# Patient Record
Sex: MALE | Race: WHITE | NOT HISPANIC OR LATINO | Employment: OTHER | URBAN - NONMETROPOLITAN AREA
[De-identification: names, ages, dates, MRNs, and addresses within clinical notes are randomized per-mention and may not be internally consistent; named-entity substitution may affect disease eponyms.]

---

## 2021-04-06 ENCOUNTER — OFFICE VISIT (OUTPATIENT)
Dept: MEDICAL GROUP | Facility: PHYSICIAN GROUP | Age: 86
End: 2021-04-06
Payer: MEDICARE

## 2021-04-06 ENCOUNTER — HOSPITAL ENCOUNTER (OUTPATIENT)
Dept: LAB | Facility: MEDICAL CENTER | Age: 86
End: 2021-04-06
Attending: INTERNAL MEDICINE
Payer: MEDICARE

## 2021-04-06 VITALS
BODY MASS INDEX: 25 KG/M2 | HEART RATE: 64 BPM | WEIGHT: 188.6 LBS | HEIGHT: 73 IN | RESPIRATION RATE: 12 BRPM | SYSTOLIC BLOOD PRESSURE: 140 MMHG | OXYGEN SATURATION: 97 % | TEMPERATURE: 97.9 F | DIASTOLIC BLOOD PRESSURE: 78 MMHG

## 2021-04-06 DIAGNOSIS — H35.30 MACULAR DEGENERATION OF BOTH EYES, UNSPECIFIED TYPE: ICD-10-CM

## 2021-04-06 DIAGNOSIS — L50.9 URTICARIA: ICD-10-CM

## 2021-04-06 DIAGNOSIS — R01.1 CARDIAC MURMUR: ICD-10-CM

## 2021-04-06 DIAGNOSIS — N40.0 BENIGN PROSTATIC HYPERPLASIA WITHOUT LOWER URINARY TRACT SYMPTOMS: ICD-10-CM

## 2021-04-06 LAB
ALBUMIN SERPL BCP-MCNC: 4.3 G/DL (ref 3.2–4.9)
ALBUMIN/GLOB SERPL: 1.3 G/DL
ALP SERPL-CCNC: 92 U/L (ref 30–99)
ALT SERPL-CCNC: 14 U/L (ref 2–50)
ANION GAP SERPL CALC-SCNC: 8 MMOL/L (ref 7–16)
AST SERPL-CCNC: 18 U/L (ref 12–45)
BASOPHILS # BLD AUTO: 0.6 % (ref 0–1.8)
BASOPHILS # BLD: 0.06 K/UL (ref 0–0.12)
BILIRUB SERPL-MCNC: 0.6 MG/DL (ref 0.1–1.5)
BUN SERPL-MCNC: 12 MG/DL (ref 8–22)
CALCIUM SERPL-MCNC: 9.3 MG/DL (ref 8.5–10.5)
CHLORIDE SERPL-SCNC: 103 MMOL/L (ref 96–112)
CO2 SERPL-SCNC: 29 MMOL/L (ref 20–33)
CREAT SERPL-MCNC: 1.01 MG/DL (ref 0.5–1.4)
EOSINOPHIL # BLD AUTO: 0.22 K/UL (ref 0–0.51)
EOSINOPHIL NFR BLD: 2.4 % (ref 0–6.9)
ERYTHROCYTE [DISTWIDTH] IN BLOOD BY AUTOMATED COUNT: 44.7 FL (ref 35.9–50)
FASTING STATUS PATIENT QL REPORTED: NORMAL
GLOBULIN SER CALC-MCNC: 3.3 G/DL (ref 1.9–3.5)
GLUCOSE SERPL-MCNC: 95 MG/DL (ref 65–99)
HCT VFR BLD AUTO: 50.6 % (ref 42–52)
HGB BLD-MCNC: 16.2 G/DL (ref 14–18)
IMM GRANULOCYTES # BLD AUTO: 0.04 K/UL (ref 0–0.11)
IMM GRANULOCYTES NFR BLD AUTO: 0.4 % (ref 0–0.9)
LYMPHOCYTES # BLD AUTO: 1.62 K/UL (ref 1–4.8)
LYMPHOCYTES NFR BLD: 17.4 % (ref 22–41)
MCH RBC QN AUTO: 30.1 PG (ref 27–33)
MCHC RBC AUTO-ENTMCNC: 32 G/DL (ref 33.7–35.3)
MCV RBC AUTO: 94.1 FL (ref 81.4–97.8)
MONOCYTES # BLD AUTO: 0.46 K/UL (ref 0–0.85)
MONOCYTES NFR BLD AUTO: 4.9 % (ref 0–13.4)
NEUTROPHILS # BLD AUTO: 6.93 K/UL (ref 1.82–7.42)
NEUTROPHILS NFR BLD: 74.3 % (ref 44–72)
NRBC # BLD AUTO: 0 K/UL
NRBC BLD-RTO: 0 /100 WBC
PLATELET # BLD AUTO: 213 K/UL (ref 164–446)
PMV BLD AUTO: 11.4 FL (ref 9–12.9)
POTASSIUM SERPL-SCNC: 4 MMOL/L (ref 3.6–5.5)
PROT SERPL-MCNC: 7.6 G/DL (ref 6–8.2)
RBC # BLD AUTO: 5.38 M/UL (ref 4.7–6.1)
SODIUM SERPL-SCNC: 140 MMOL/L (ref 135–145)
WBC # BLD AUTO: 9.3 K/UL (ref 4.8–10.8)

## 2021-04-06 PROCEDURE — 36415 COLL VENOUS BLD VENIPUNCTURE: CPT | Mod: GA

## 2021-04-06 PROCEDURE — 85025 COMPLETE CBC W/AUTO DIFF WBC: CPT

## 2021-04-06 PROCEDURE — 99214 OFFICE O/P EST MOD 30 MIN: CPT | Performed by: INTERNAL MEDICINE

## 2021-04-06 PROCEDURE — 84443 ASSAY THYROID STIM HORMONE: CPT | Mod: GA

## 2021-04-06 PROCEDURE — 80053 COMPREHEN METABOLIC PANEL: CPT

## 2021-04-06 RX ORDER — LEVOCETIRIZINE DIHYDROCHLORIDE 5 MG/1
5 TABLET, FILM COATED ORAL
Qty: 30 TABLET | Refills: 5 | Status: SHIPPED | OUTPATIENT
Start: 2021-04-06 | End: 2021-06-21

## 2021-04-06 RX ORDER — ASPIRIN/CALCIUM/MAG/ALUMINUM 325 MG
650 TABLET ORAL EVERY 4 HOURS PRN
COMMUNITY
End: 2024-01-30

## 2021-04-06 ASSESSMENT — PATIENT HEALTH QUESTIONNAIRE - PHQ9: CLINICAL INTERPRETATION OF PHQ2 SCORE: 0

## 2021-04-06 NOTE — PROGRESS NOTES
Chief Complaint   Patient presents with   • Rash     back for 6 months        HISTORY OF PRESENT ILLNESS: Patient is a 85 y.o. male established patient who presents today to discuss the medical issues below.    Urticaria  Patient reports he has had itching on his back and under the left arm x about 6 mos.  States stays about the same not getting better or worse.  He did see primary care about 3 mos ago was given hydrocortisone cream and that didn't help at all.  He reports his wife  in 2019, however, he has been using the same soaps.  He takes vitamin supplements occasionally.  Itching comes and goes, was present this am, and then resolved and is absent now. Less at night, worse during the day.  He did try some benadryl the 1 week ago, slept well, he didn't really note change to the itching.    Cardiac murmur  Patient reports history of heart murmur x most of his life.      Macular degeneration  Patient has been receiving injections and reports the eye doctor indicates this is improving.  He had last injection in  and is due again for follow up in May.  He does indicate the itching began after the first injection but not clearly associated.      Patient Active Problem List    Diagnosis Date Noted   • Urticaria 2021   • Cardiac murmur 2021   • Benign prostatic hyperplasia without lower urinary tract symptoms 2021   • Macular degeneration 2021       Allergies:Gold and Silver    Current Outpatient Medications   Medication Sig Dispense Refill   • aspirin buffered (ASCRIPTIN) 325 MG Tab Take 650 mg by mouth every four hours as needed.     • Levocetirizine Dihydrochloride 5 MG Tab Take 1 tablet by mouth 1 time a day as needed (urticaria). 30 tablet 5     No current facility-administered medications for this visit.         No past medical history on file.    Social History     Tobacco Use   • Smoking status: Not on file   Substance Use Topics   • Alcohol use: Not on file   • Drug  "use: Not on file       No family status information on file.   No family history on file.    ROS:    Respiratory: Negative for cough, sputum production, shortness of breath or wheezing.    Cardiovascular: Negative for chest pain, palpitations, orthopnea, dyspnea with exertion or edema.   Gastrointestinal: Negative for GI upset, nausea, vomiting, abdominal pain, constipation or diarrhea.   Genitourinary: Negative for dysuria, urgency, hesitancy or frequency.       Exam:    /78 (BP Location: Left arm, Patient Position: Sitting, BP Cuff Size: Adult)   Pulse 64   Temp 36.6 °C (97.9 °F) (Temporal)   Resp 12   Ht 1.854 m (6' 1\")   Wt 85.5 kg (188 lb 9.6 oz)   SpO2 97%  Body mass index is 24.88 kg/m².  General:  Well nourished, well developed male in NAD.  Skin: minimally xerotic, rash present across bilateral back around the thoracic spine macular patches slightly raised no vesicles measuring 3 to 4 cm in length 2 cm in diameter watershed distribution 1 small patch on the left which is dry and cracking on the right this is slightly edematous.  No warmth  Pulmonary: Clear to ausculation and percussion.  Normal effort. No rales, rhonchi, or wheezing.  Cardiovascular: Regular rate and rhythm grade 3 out of 6 systolic ejection murmur at the left sternal margin  Abdomen: Normal bowel sounds soft and nontender no palpable liver spleen bladder mass.  Extremities: No LE edema noted.  Neuro: Grossly nonfocal.  Psych: Alert and oriented to person, place, and time. Appropriate mood and conversation.        This dictation was created using voice recognition software. I have made reasonable attempts to correct errors, however, errors of grammar and content may exist.          Assessment/Plan:    1. Urticaria  Nonspecific rash differential could include xerotic dermatitis cannot entirely exclude secondary process involving cutaneous lymphoma etc.  Will obtain labs.  Significantly he is much better today symptomatically.  " Rash is prominent no evidence of infection.  Monitor with antihistamine nonsedating over-the-counter.  Discussed avoiding soaps, hot water and abrasion.  Early recheck consideration for biopsy as needed persistence.  Unclear if the timeframe elevation to the injections for his macular degeneration are significant or not.  No evidence of medication or soap contributing.  - Comp Metabolic Panel; Future  - CBC WITH DIFFERENTIAL; Future  - TSH WITH REFLEX TO FT4; Future    2. Cardiac murmur  Consistent with aortic stenosis clinically stable no syncope.  Monitor    3. Benign prostatic hyperplasia without lower urinary tract symptoms  Status post TURP clinically silent    4. Macular degeneration of both eyes, unspecified type  Following with ophthalmology as above.       Patient was seen for 45 minutes face to face of which more than 50% of the time was spent in counseling and coordination of care regarding the above problems.

## 2021-04-06 NOTE — PATIENT INSTRUCTIONS
Labs today  Moisturizing lotion to itching areas 2-3 x a day  Avoid soap and scrubbing to the back    Levocetirizine 5 mg 1 a day if itchy.     Recheck if persisting in 6-8 weeks, may need biopsy with referral to dermatology depending on what your lab work shows.

## 2021-04-06 NOTE — ASSESSMENT & PLAN NOTE
Patient has been receiving injections and reports the eye doctor indicates this is improving.  He had last injection in Jan and is due again for follow up in May.  He does indicate the itching began after the first injection but not clearly associated.

## 2021-04-06 NOTE — ASSESSMENT & PLAN NOTE
Patient reports he has had itching on his back and under the left arm x about 6 mos.  States stays about the same not getting better or worse.  He did see primary care about 3 mos ago was given hydrocortisone cream and that didn't help at all.  He reports his wife  in 2019, however, he has been using the same soaps.  He takes vitamin supplements occasionally.  Itching comes and goes, was present this am, and then resolved and is absent now. Less at night, worse during the day.  He did try some benadryl the 1 week ago, slept well, he didn't really note change to the itching.

## 2021-04-07 LAB — TSH SERPL DL<=0.005 MIU/L-ACNC: 1.48 UIU/ML (ref 0.38–5.33)

## 2021-06-21 ENCOUNTER — OFFICE VISIT (OUTPATIENT)
Dept: MEDICAL GROUP | Facility: PHYSICIAN GROUP | Age: 86
End: 2021-06-21
Payer: MEDICARE

## 2021-06-21 VITALS
HEIGHT: 73 IN | RESPIRATION RATE: 14 BRPM | WEIGHT: 188 LBS | HEART RATE: 86 BPM | TEMPERATURE: 98.6 F | BODY MASS INDEX: 24.92 KG/M2 | DIASTOLIC BLOOD PRESSURE: 84 MMHG | SYSTOLIC BLOOD PRESSURE: 140 MMHG | OXYGEN SATURATION: 98 %

## 2021-06-21 DIAGNOSIS — L50.9 URTICARIA: ICD-10-CM

## 2021-06-21 DIAGNOSIS — N40.0 BENIGN PROSTATIC HYPERPLASIA WITHOUT LOWER URINARY TRACT SYMPTOMS: ICD-10-CM

## 2021-06-21 DIAGNOSIS — R01.1 CARDIAC MURMUR: ICD-10-CM

## 2021-06-21 DIAGNOSIS — H35.30 MACULAR DEGENERATION OF BOTH EYES, UNSPECIFIED TYPE: ICD-10-CM

## 2021-06-21 PROCEDURE — 99214 OFFICE O/P EST MOD 30 MIN: CPT | Performed by: INTERNAL MEDICINE

## 2021-06-21 ASSESSMENT — FIBROSIS 4 INDEX: FIB4 SCORE: 1.92

## 2021-06-21 NOTE — PROGRESS NOTES
Chief Complaint   Patient presents with   • Follow-Up     blood work       HISTORY OF PRESENT ILLNESS: Patient is a 85 y.o. male established patient who presents today to discuss the medical issues below.    Macular degeneration  Patient following with opthalmology and is having injections.  He states vision is stable, having fewer injections.  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  Patient states this is his only problem.      Urticaria  Patient has found that Aleve is helpful.  Doesn't feel anything else is helping.      Cardiac murmur  Denies syncope.      Benign prostatic hyperplasia without lower urinary tract symptoms  Hx turp, no complaints.        Patient Active Problem List    Diagnosis Date Noted   • Urticaria 04/06/2021   • Cardiac murmur 04/06/2021   • Benign prostatic hyperplasia without lower urinary tract symptoms 04/06/2021   • Macular degeneration 04/06/2021       Allergies:Gold and Silver    Current Outpatient Medications   Medication Sig Dispense Refill   • aspirin buffered (ASCRIPTIN) 325 MG Tab Take 650 mg by mouth every four hours as needed. (Patient not taking: Reported on 6/21/2021)       No current facility-administered medications for this visit.         No past medical history on file.    Social History     Tobacco Use   • Smoking status: Former Smoker   • Smokeless tobacco: Never Used   Substance Use Topics   • Alcohol use: Not on file   • Drug use: Not on file       No family status information on file.   No family history on file.    ROS:    Respiratory: Negative for cough, sputum production, shortness of breath or wheezing.    Cardiovascular: Negative for chest pain, palpitations, orthopnea, dyspnea with exertion or edema.   Gastrointestinal: Negative for GI upset, nausea, vomiting, abdominal pain, constipation or diarrhea.   Genitourinary: Negative for dysuria, urgency, hesitancy or frequency.       Exam:    /84   Pulse 86   Temp 37 °C (98.6  "°F) (Temporal)   Resp 14   Ht 1.854 m (6' 1\")   Wt 85.3 kg (188 lb)   SpO2 98%  Body mass index is 24.8 kg/m².  General:  Well nourished, well developed male in NAD.  Pulmonary: Clear to ausculation and percussion.  Normal effort. No rales, rhonchi, or wheezing.  Cardiovascular: Regular rate and rhythm without coarse holosystolic left margin murmur.   Abdomen: Normal bowel sounds soft and nontender no palpable liver spleen bladder mass.  Extremities: No LE edema noted.  Neuro: Grossly nonfocal. Diminished hearing  Psych: Alert and oriented to person, place, and time. Appropriate mood and conversation.    LABS: Results reviewed and discussed with the patient, questions answered.    This dictation was created using voice recognition software. I have made reasonable attempts to correct errors, however, errors of grammar and content may exist.          Assessment/Plan:    1. Macular degeneration of both eyes, unspecified type  Support given, he is still able to target shoot.  Defer to optho    2. Urticaria  Resolved with prn aleve, reviewed labs, ok use of the aleve discussed.      3. Cardiac murmur  No syncope, support and monitor.      4. Benign prostatic hyperplasia without lower urinary tract symptoms  Stable post turp.      Patient was seen for 25 minutes face to face of which more than 50% of the time was spent in counseling and coordination of care regarding the above problems.           "

## 2021-06-21 NOTE — ASSESSMENT & PLAN NOTE
Patient following with opthalmology and is having injections.  He states vision is stable, having fewer injections.  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  Patient states this is his only problem.

## 2021-12-21 ENCOUNTER — OFFICE VISIT (OUTPATIENT)
Dept: MEDICAL GROUP | Facility: PHYSICIAN GROUP | Age: 86
End: 2021-12-21
Payer: MEDICARE

## 2021-12-21 VITALS
SYSTOLIC BLOOD PRESSURE: 140 MMHG | TEMPERATURE: 98.9 F | OXYGEN SATURATION: 96 % | HEIGHT: 73 IN | HEART RATE: 70 BPM | BODY MASS INDEX: 24.9 KG/M2 | DIASTOLIC BLOOD PRESSURE: 80 MMHG | RESPIRATION RATE: 18 BRPM | WEIGHT: 187.9 LBS

## 2021-12-21 DIAGNOSIS — L50.9 URTICARIA: ICD-10-CM

## 2021-12-21 DIAGNOSIS — E78.5 HYPERLIPIDEMIA, UNSPECIFIED HYPERLIPIDEMIA TYPE: ICD-10-CM

## 2021-12-21 DIAGNOSIS — R01.1 CARDIAC MURMUR: ICD-10-CM

## 2021-12-21 DIAGNOSIS — L40.9 PSORIASIS (A TYPE OF SKIN INFLAMMATION): ICD-10-CM

## 2021-12-21 PROCEDURE — 99214 OFFICE O/P EST MOD 30 MIN: CPT | Performed by: INTERNAL MEDICINE

## 2021-12-21 RX ORDER — TRIAMCINOLONE ACETONIDE 1 MG/G
0.5 CREAM TOPICAL 3 TIMES DAILY PRN
Qty: 45 G | Refills: 1 | Status: SHIPPED | OUTPATIENT
Start: 2021-12-21 | End: 2024-01-30

## 2021-12-21 RX ORDER — PREDNISONE 10 MG/1
TABLET ORAL
Qty: 32 TABLET | Refills: 0 | Status: SHIPPED | OUTPATIENT
Start: 2021-12-21 | End: 2024-01-30

## 2021-12-21 ASSESSMENT — FIBROSIS 4 INDEX: FIB4 SCORE: 1.94

## 2021-12-21 NOTE — PATIENT INSTRUCTIONS
Prednisone 10 mg tablet:  2 tabs 2 x a day x 2 days,   Then 3 tablets every morning x 4 days,   Then 2 tabs every morning x 4 days,   Then 1 tab every morning x 4 days    Steroid lotion topically if itching 3 x a day    Dermatology    Moisturizing lotion to itching areas 2-3 x a day  Avoid soap and scrubbing to the back

## 2021-12-21 NOTE — PROGRESS NOTES
Chief Complaint   Patient presents with   • Follow-Up   • Herpes Zoster     all over per patient- under arms, chest, back       HISTORY OF PRESENT ILLNESS: Patient is a 86 y.o. male established patient who presents today to discuss the medical issues below.    Psoriasis (a type of skin inflammation)  Today patient reports he thinks he has shingles as the rash on his body as become very uncomfortable.  He reports increase itching, he indicates the rash was a problem last time we saw him.  My note indicates it had improved.  He feels this has been a problem for over a year.  Today he indicates 6 mos ago he was given a steroid shot at [facility] which did not help at all.  The trial moisturizing lotion did not help nor did antihistamine.        Patient Active Problem List    Diagnosis Date Noted   • Psoriasis (a type of skin inflammation) 12/21/2021   • Urticaria 04/06/2021   • Cardiac murmur 04/06/2021   • Benign prostatic hyperplasia without lower urinary tract symptoms 04/06/2021   • Macular degeneration 04/06/2021       Allergies:Gold and Silver    Current Outpatient Medications   Medication Sig Dispense Refill   • predniSONE (DELTASONE) 10 MG Tab Prednisone 10 mg tablet: 2 tabs 2 x daily x 2 days, 3 tablets q am x 4 days, 2 tabs q am x 4 days, 1 tab every morning x 4 days 32 Tablet 0   • triamcinolone acetonide (KENALOG) 0.1 % Cream Apply 0.5 g topically 3 times a day as needed. 45 g 1   • aspirin buffered (ASCRIPTIN) 325 MG Tab Take 650 mg by mouth every four hours as needed. (Patient not taking: Reported on 6/21/2021)       No current facility-administered medications for this visit.         History reviewed. No pertinent past medical history.    Social History     Tobacco Use   • Smoking status: Former Smoker   • Smokeless tobacco: Never Used   Substance Use Topics   • Alcohol use: Not on file   • Drug use: Not on file       No family status information on file.   History reviewed. No pertinent family  "history.    ROS:    Respiratory: Negative for cough, sputum production, shortness of breath or wheezing.    Cardiovascular: Negative for chest pain, palpitations, orthopnea, dyspnea with exertion or edema.   Gastrointestinal: Negative for GI upset, nausea, vomiting, abdominal pain, constipation or diarrhea.   Genitourinary: Negative for dysuria, urgency, hesitancy or frequency.       Exam:    /80   Pulse 70   Temp 37.2 °C (98.9 °F) (Temporal)   Resp 18   Ht 1.854 m (6' 1\")   Wt 85.2 kg (187 lb 14.4 oz)   SpO2 96%  Body mass index is 24.79 kg/m².  General:  Well nourished, well developed male in NAD.  Skin: thickened erythematous patches, right posterior back with central excoriation, left axilla thickened and dry, patch at the right abdomen sans excoriation.    Pulmonary: Clear to ausculation and percussion.  Normal effort. No rales, rhonchi, or wheezing.  Cardiovascular: Regular rate and rhythm without murmur.   Abdomen: Normal bowel sounds soft and nontender no palpable liver spleen bladder mass.  Extremities: No LE edema noted.  Neuro: Grossly nonfocal.  Psych: Alert and oriented to person, place, and time. Appropriate mood and conversation.    LABS: Results reviewed and discussed with the patient, questions answered.  This dictation was created using voice recognition software. I have made reasonable attempts to correct errors, however, errors of grammar and content may exist.          Assessment/Plan:    1. Psoriasis (a type of skin inflammation)  More classic in appearance, now thickened plaques, monitor with prednisone 10 day course, topical steroids, dermatology.    - Referral to Dermatology    2. Urticaria  As above, labs in April all WNL including thyroid.   - Referral to Dermatology  - Comp Metabolic Panel; Future  - CBC WITH DIFFERENTIAL; Future    3. Cardiac murmur  Quite today.     4. Hyperlipidemia, unspecified hyperlipidemia type  Annual monitoring recommended.    - Lipid Profile; " Future    Patient was seen for 25 minutes face to face of which more than 50% of the time was spent in counseling and coordination of care regarding the above problems.

## 2021-12-21 NOTE — ASSESSMENT & PLAN NOTE
Today patient reports he thinks he has shingles as the rash on his body as become very uncomfortable.  He reports increase itching, he indicates the rash was a problem last time we saw him.  My note indicates it had improved.  He feels this has been a problem for over a year.  Today he indicates 6 mos ago he was given a steroid shot at [facility] which did not help at all.  The trial moisturizing lotion did not help nor did antihistamine.

## 2022-02-16 ENCOUNTER — APPOINTMENT (RX ONLY)
Dept: URBAN - NONMETROPOLITAN AREA CLINIC 15 | Facility: CLINIC | Age: 87
Setting detail: DERMATOLOGY
End: 2022-02-16

## 2022-02-16 DIAGNOSIS — R21 RASH AND OTHER NONSPECIFIC SKIN ERUPTION: ICD-10-CM

## 2022-02-16 DIAGNOSIS — L30.9 DERMATITIS, UNSPECIFIED: ICD-10-CM

## 2022-02-16 PROCEDURE — ? COUNSELING

## 2022-02-16 PROCEDURE — 99202 OFFICE O/P NEW SF 15 MIN: CPT | Mod: 25

## 2022-02-16 PROCEDURE — 11102 TANGNTL BX SKIN SINGLE LES: CPT

## 2022-02-16 PROCEDURE — 11103 TANGNTL BX SKIN EA SEP/ADDL: CPT

## 2022-02-16 PROCEDURE — ? BIOPSY BY SHAVE METHOD

## 2022-02-16 PROCEDURE — ? PRESCRIPTION

## 2022-02-16 ASSESSMENT — LOCATION ZONE DERM
LOCATION ZONE: TRUNK
LOCATION ZONE: ARM

## 2022-02-16 ASSESSMENT — LOCATION DETAILED DESCRIPTION DERM
LOCATION DETAILED: LEFT ANTERIOR PROXIMAL UPPER ARM
LOCATION DETAILED: RIGHT ANTERIOR PROXIMAL UPPER ARM
LOCATION DETAILED: LEFT RIB CAGE
LOCATION DETAILED: RIGHT INFERIOR UPPER BACK
LOCATION DETAILED: LEFT LATERAL INFERIOR CHEST

## 2022-02-16 ASSESSMENT — LOCATION SIMPLE DESCRIPTION DERM
LOCATION SIMPLE: RIGHT UPPER ARM
LOCATION SIMPLE: RIGHT UPPER BACK
LOCATION SIMPLE: CHEST
LOCATION SIMPLE: LEFT UPPER ARM
LOCATION SIMPLE: ABDOMEN

## 2022-02-16 NOTE — HPI: RASH
What Type Of Note Output Would You Prefer (Optional)?: Standard Output
Is The Patient Presenting As Previously Scheduled?: Yes
How Severe Is Your Rash?: severe
Is This A New Presentation, Or A Follow-Up?: Referral for Rash
Who Is Your Referring Provider?: Mao

## 2022-02-16 NOTE — PROCEDURE: BIOPSY BY SHAVE METHOD
Detail Level: Detailed
Depth Of Biopsy: dermis
Was A Bandage Applied: Yes
Size Of Lesion In Cm: 0
Biopsy Type: H and E
Biopsy Method: Personna blade
Anesthesia Type: 1% lidocaine with epinephrine
Anesthesia Volume In Cc: 1
Hemostasis: Electrocautery
Wound Care: Vaseline
Dressing: Band-Aid
Destruction After The Procedure: No
Type Of Destruction Used: Electrodesiccation
Curettage Text: The wound bed was treated with curettage after the biopsy was performed.
Cryotherapy Text: The wound bed was treated with cryotherapy after the biopsy was performed.
Electrodesiccation Text: The wound bed was treated with electrodesiccation after the biopsy was performed.
Electrodesiccation And Curettage Text: The wound bed was treated with electrodesiccation and curettage after the biopsy was performed.
Silver Nitrate Text: The wound bed was treated with silver nitrate after the biopsy was performed.
Lab: [id number]
Lab Facility: 
Consent: Verbal consent was obtained and risks were reviewed including but not limited to scarring, infection, bleeding, scabbing, incomplete removal, nerve damage and allergy to anesthesia.
Post-Care Instructions: I reviewed with the patient in detail post-care instructions. Patient is to keep the biopsy site dry overnight, and then apply bacitracin/petroleum  twice daily until healed. Patient may apply hydrogen peroxide soaks to remove any crusting.
Notification Instructions: Patient will be notified of biopsy results. However, patient instructed to call the office if not contacted within 2 weeks.
Billing Type: Third-Party Bill
Information: Selecting Yes will display possible errors in your note based on the variables you have selected. This validation is only offered as a suggestion for you. PLEASE NOTE THAT THE VALIDATION TEXT WILL BE REMOVED WHEN YOU FINALIZE YOUR NOTE. IF YOU WANT TO FAX A PRELIMINARY NOTE YOU WILL NEED TO TOGGLE THIS TO 'NO' IF YOU DO NOT WANT IT IN YOUR FAXED NOTE.
Consent: Written consent was obtained and risks were reviewed including but not limited to scarring, infection, bleeding, scabbing, incomplete removal, nerve damage and allergy to anesthesia.

## 2022-02-17 RX ORDER — TRIAMCINOLONE ACETONIDE 1 MG/G
CREAM TOPICAL BID
Qty: 30 | Refills: 1 | Status: ERX

## 2022-02-25 ENCOUNTER — APPOINTMENT (RX ONLY)
Dept: URBAN - METROPOLITAN AREA CLINIC 4 | Facility: CLINIC | Age: 87
Setting detail: DERMATOLOGY
End: 2022-02-25

## 2022-02-25 ENCOUNTER — RX ONLY (OUTPATIENT)
Age: 87
Setting detail: RX ONLY
End: 2022-02-25

## 2022-02-25 DIAGNOSIS — C84.0 MYCOSIS FUNGOIDES: ICD-10-CM

## 2022-02-25 PROBLEM — C84.00 MYCOSIS FUNGOIDES, UNSPECIFIED SITE: Status: ACTIVE | Noted: 2022-02-25

## 2022-02-25 PROCEDURE — ? ORDER TESTS

## 2022-02-25 RX ORDER — CLOBETASOL PROPIONATE 0.5 MG/G
CREAM TOPICAL
Qty: 120 | Refills: 2 | Status: ERX

## 2022-02-25 NOTE — PROCEDURE: ORDER TESTS
Billing Type: Third-Party Bill
Lab Facility: [id number]
Bill For Surgical Tray: no
Expected Date Of Service: 02/25/2022

## 2022-03-02 ENCOUNTER — RX ONLY (OUTPATIENT)
Age: 87
Setting detail: RX ONLY
End: 2022-03-02

## 2022-03-02 RX ORDER — CLOBETASOL PROPIONATE 0.5 MG/G
CREAM TOPICAL
Qty: 120 | Refills: 3 | Status: ERX

## 2022-03-05 ENCOUNTER — APPOINTMENT (RX ONLY)
Dept: URBAN - METROPOLITAN AREA CLINIC 4 | Facility: CLINIC | Age: 87
Setting detail: DERMATOLOGY
End: 2022-03-05

## 2022-03-05 DIAGNOSIS — C84.0 MYCOSIS FUNGOIDES: ICD-10-CM

## 2022-03-05 PROBLEM — C84.00 MYCOSIS FUNGOIDES, UNSPECIFIED SITE: Status: ACTIVE | Noted: 2022-03-05

## 2022-03-05 PROCEDURE — ? ORDER TESTS

## 2024-01-31 PROBLEM — Z86.79 HISTORY OF HEART FAILURE: Status: ACTIVE | Noted: 2024-01-31

## 2024-01-31 PROBLEM — Z86.16 HISTORY OF COVID-19: Status: ACTIVE | Noted: 2024-01-31

## 2024-01-31 PROBLEM — I48.91 ATRIAL FIBRILLATION (HCC): Status: ACTIVE | Noted: 2024-01-31

## 2024-01-31 PROBLEM — Z98.890 HISTORY OF MITRAL VALVE REPAIR: Status: ACTIVE | Noted: 2024-01-31

## 2024-03-06 PROBLEM — R21 SKIN RASH: Status: ACTIVE | Noted: 2024-03-06

## 2024-03-06 PROBLEM — I27.20 PULMONARY HTN (HCC): Status: ACTIVE | Noted: 2024-03-06

## 2024-04-30 PROBLEM — Z95.818 S/P MITRAL VALVE CLIP IMPLANTATION: Status: ACTIVE | Noted: 2024-01-31

## 2024-04-30 PROBLEM — I34.0 MODERATE MITRAL REGURGITATION: Status: ACTIVE | Noted: 2024-04-30

## 2024-06-06 ENCOUNTER — APPOINTMENT (RX ONLY)
Dept: URBAN - METROPOLITAN AREA CLINIC 4 | Facility: CLINIC | Age: 89
Setting detail: DERMATOLOGY
End: 2024-06-06

## 2024-06-06 DIAGNOSIS — C84.0 MYCOSIS FUNGOIDES: ICD-10-CM

## 2024-06-06 PROBLEM — C84.00 MYCOSIS FUNGOIDES, UNSPECIFIED SITE: Status: ACTIVE | Noted: 2024-06-06

## 2024-06-06 PROCEDURE — 99212 OFFICE O/P EST SF 10 MIN: CPT

## 2024-06-06 PROCEDURE — ? COUNSELING

## 2024-06-06 PROCEDURE — ? ADDITIONAL NOTES

## 2024-06-06 ASSESSMENT — LOCATION DETAILED DESCRIPTION DERM: LOCATION DETAILED: LEFT LATERAL INFERIOR CHEST

## 2024-06-06 ASSESSMENT — LOCATION SIMPLE DESCRIPTION DERM: LOCATION SIMPLE: CHEST

## 2024-06-06 ASSESSMENT — LOCATION ZONE DERM: LOCATION ZONE: TRUNK

## 2024-06-06 NOTE — PROCEDURE: ADDITIONAL NOTES
Detail Level: Simple
Additional Notes: 06/06/2024: Patient referred to us by [facility] Specialist for MF for UVB treatment. Given the extent of patient’s MF, phototherapy would not be the best option. Instead, we recommend radiation therapy and chemotherapy as recommended by his oncologist Dr. Hawkins. Extensive counseling on diagnosis was done. Dr. Rodas will speak with his oncologist Dr. Hawkins regarding treatment plan.
Render Risk Assessment In Note?: no

## 2024-06-06 NOTE — HPI: RASH (MYCOSIS FUNGOIDES)
How Severe Is It?: severe
Is This A New Presentation, Or A Follow-Up?: Rash
Additional History: Patient was seen at [facility] in 02/2022, biopsy confirmed Mycosis Fungoides. He has not been receiving treatment and has been getting worse. He has been using aspercream lidocaine.

## 2024-06-20 ENCOUNTER — HOSPITAL ENCOUNTER (OUTPATIENT)
Facility: MEDICAL CENTER | Age: 89
End: 2024-06-20
Attending: STUDENT IN AN ORGANIZED HEALTH CARE EDUCATION/TRAINING PROGRAM
Payer: MEDICARE

## 2024-06-20 LAB
ALBUMIN SERPL BCP-MCNC: 4.1 G/DL (ref 3.2–4.9)
ALBUMIN/GLOB SERPL: 1.1 G/DL
ALP SERPL-CCNC: 149 U/L (ref 30–99)
ALT SERPL-CCNC: 15 U/L (ref 2–50)
ANION GAP SERPL CALC-SCNC: 16 MMOL/L (ref 7–16)
AST SERPL-CCNC: 28 U/L (ref 12–45)
BASOPHILS # BLD AUTO: 0.5 % (ref 0–1.8)
BASOPHILS # BLD: 0.05 K/UL (ref 0–0.12)
BILIRUB SERPL-MCNC: 1.9 MG/DL (ref 0.1–1.5)
BUN SERPL-MCNC: 15 MG/DL (ref 8–22)
CALCIUM ALBUM COR SERPL-MCNC: 9.1 MG/DL (ref 8.5–10.5)
CALCIUM SERPL-MCNC: 9.2 MG/DL (ref 8.5–10.5)
CHLORIDE SERPL-SCNC: 98 MMOL/L (ref 96–112)
CO2 SERPL-SCNC: 25 MMOL/L (ref 20–33)
CREAT SERPL-MCNC: 1.05 MG/DL (ref 0.5–1.4)
EOSINOPHIL # BLD AUTO: 0.16 K/UL (ref 0–0.51)
EOSINOPHIL NFR BLD: 1.7 % (ref 0–6.9)
ERYTHROCYTE [DISTWIDTH] IN BLOOD BY AUTOMATED COUNT: 52.8 FL (ref 35.9–50)
GFR SERPLBLD CREATININE-BSD FMLA CKD-EPI: 68 ML/MIN/1.73 M 2
GLOBULIN SER CALC-MCNC: 3.7 G/DL (ref 1.9–3.5)
GLUCOSE SERPL-MCNC: 108 MG/DL (ref 65–99)
HBV CORE AB SERPL QL IA: REACTIVE
HBV SURFACE AB SERPL IA-ACNC: 207 MIU/ML (ref 0–10)
HBV SURFACE AG SER QL: NORMAL
HCT VFR BLD AUTO: 52.6 % (ref 42–52)
HCV AB SER QL: NORMAL
HGB BLD-MCNC: 16.6 G/DL (ref 14–18)
IMM GRANULOCYTES # BLD AUTO: 0.03 K/UL (ref 0–0.11)
IMM GRANULOCYTES NFR BLD AUTO: 0.3 % (ref 0–0.9)
LYMPHOCYTES # BLD AUTO: 1.31 K/UL (ref 1–4.8)
LYMPHOCYTES NFR BLD: 14 % (ref 22–41)
MCH RBC QN AUTO: 29.3 PG (ref 27–33)
MCHC RBC AUTO-ENTMCNC: 31.6 G/DL (ref 32.3–36.5)
MCV RBC AUTO: 92.9 FL (ref 81.4–97.8)
MONOCYTES # BLD AUTO: 0.57 K/UL (ref 0–0.85)
MONOCYTES NFR BLD AUTO: 6.1 % (ref 0–13.4)
NEUTROPHILS # BLD AUTO: 7.24 K/UL (ref 1.82–7.42)
NEUTROPHILS NFR BLD: 77.4 % (ref 44–72)
NRBC # BLD AUTO: 0 K/UL
NRBC BLD-RTO: 0 /100 WBC (ref 0–0.2)
PLATELET # BLD AUTO: 211 K/UL (ref 164–446)
PMV BLD AUTO: 11.3 FL (ref 9–12.9)
POTASSIUM SERPL-SCNC: 4.3 MMOL/L (ref 3.6–5.5)
PROT SERPL-MCNC: 7.8 G/DL (ref 6–8.2)
RBC # BLD AUTO: 5.66 M/UL (ref 4.7–6.1)
SODIUM SERPL-SCNC: 139 MMOL/L (ref 135–145)
WBC # BLD AUTO: 9.4 K/UL (ref 4.8–10.8)

## 2024-06-20 PROCEDURE — 87340 HEPATITIS B SURFACE AG IA: CPT | Mod: GA

## 2024-06-20 PROCEDURE — 86803 HEPATITIS C AB TEST: CPT

## 2024-06-20 PROCEDURE — 80053 COMPREHEN METABOLIC PANEL: CPT

## 2024-06-20 PROCEDURE — 85025 COMPLETE CBC W/AUTO DIFF WBC: CPT

## 2024-06-20 PROCEDURE — 86704 HEP B CORE ANTIBODY TOTAL: CPT | Mod: GA

## 2024-06-20 PROCEDURE — 86706 HEP B SURFACE ANTIBODY: CPT | Mod: GA

## 2024-06-25 VITALS — BODY MASS INDEX: 21.89 KG/M2 | HEIGHT: 72 IN | WEIGHT: 161.6 LBS

## 2024-06-25 DIAGNOSIS — C84.09 MYCOSIS FUNGOIDES INVOLVING EXTRANODAL SITE EXCLUDING SPLEEN AND OTHER SOLID ORGANS (HCC): Chronic | ICD-10-CM

## 2024-06-25 RX ORDER — 0.9 % SODIUM CHLORIDE 0.9 %
10 VIAL (ML) INJECTION PRN
Status: CANCELLED | OUTPATIENT
Start: 2024-07-26

## 2024-06-25 RX ORDER — 0.9 % SODIUM CHLORIDE 0.9 %
VIAL (ML) INJECTION PRN
Status: CANCELLED | OUTPATIENT
Start: 2024-07-26

## 2024-06-25 RX ORDER — DIPHENHYDRAMINE HYDROCHLORIDE 50 MG/ML
50 INJECTION INTRAMUSCULAR; INTRAVENOUS PRN
Status: CANCELLED | OUTPATIENT
Start: 2024-07-19

## 2024-06-25 RX ORDER — 0.9 % SODIUM CHLORIDE 0.9 %
3 VIAL (ML) INJECTION PRN
Status: CANCELLED | OUTPATIENT
Start: 2024-07-08

## 2024-06-25 RX ORDER — ONDANSETRON 2 MG/ML
4 INJECTION INTRAMUSCULAR; INTRAVENOUS PRN
Status: CANCELLED | OUTPATIENT
Start: 2024-07-26

## 2024-06-25 RX ORDER — METHYLPREDNISOLONE SODIUM SUCCINATE 125 MG/2ML
125 INJECTION, POWDER, LYOPHILIZED, FOR SOLUTION INTRAMUSCULAR; INTRAVENOUS PRN
Status: CANCELLED | OUTPATIENT
Start: 2024-07-26

## 2024-06-25 RX ORDER — SODIUM CHLORIDE 9 MG/ML
INJECTION, SOLUTION INTRAVENOUS CONTINUOUS
Status: CANCELLED | OUTPATIENT
Start: 2024-07-19

## 2024-06-25 RX ORDER — 0.9 % SODIUM CHLORIDE 0.9 %
10 VIAL (ML) INJECTION PRN
Status: CANCELLED | OUTPATIENT
Start: 2024-07-12

## 2024-06-25 RX ORDER — SODIUM CHLORIDE 9 MG/ML
INJECTION, SOLUTION INTRAVENOUS CONTINUOUS
Status: CANCELLED | OUTPATIENT
Start: 2024-07-12

## 2024-06-25 RX ORDER — METHYLPREDNISOLONE SODIUM SUCCINATE 125 MG/2ML
125 INJECTION, POWDER, LYOPHILIZED, FOR SOLUTION INTRAMUSCULAR; INTRAVENOUS PRN
Status: CANCELLED | OUTPATIENT
Start: 2024-07-12

## 2024-06-25 RX ORDER — SODIUM CHLORIDE 9 MG/ML
INJECTION, SOLUTION INTRAVENOUS CONTINUOUS
OUTPATIENT
Start: 2024-08-02

## 2024-06-25 RX ORDER — 0.9 % SODIUM CHLORIDE 0.9 %
10 VIAL (ML) INJECTION PRN
Status: CANCELLED | OUTPATIENT
Start: 2024-07-19

## 2024-06-25 RX ORDER — 0.9 % SODIUM CHLORIDE 0.9 %
3 VIAL (ML) INJECTION PRN
Status: CANCELLED | OUTPATIENT
Start: 2024-07-19

## 2024-06-25 RX ORDER — ONDANSETRON 8 MG/1
8 TABLET, ORALLY DISINTEGRATING ORAL PRN
OUTPATIENT
Start: 2024-08-02

## 2024-06-25 RX ORDER — PROCHLORPERAZINE MALEATE 10 MG
10 TABLET ORAL EVERY 6 HOURS PRN
Status: CANCELLED | OUTPATIENT
Start: 2024-07-19

## 2024-06-25 RX ORDER — ONDANSETRON 2 MG/ML
4 INJECTION INTRAMUSCULAR; INTRAVENOUS PRN
OUTPATIENT
Start: 2024-08-02

## 2024-06-25 RX ORDER — 0.9 % SODIUM CHLORIDE 0.9 %
3 VIAL (ML) INJECTION PRN
Status: CANCELLED | OUTPATIENT
Start: 2024-07-12

## 2024-06-25 RX ORDER — EPINEPHRINE 1 MG/ML(1)
0.5 AMPUL (ML) INJECTION PRN
Status: CANCELLED | OUTPATIENT
Start: 2024-07-12

## 2024-06-25 RX ORDER — METHYLPREDNISOLONE SODIUM SUCCINATE 125 MG/2ML
125 INJECTION, POWDER, LYOPHILIZED, FOR SOLUTION INTRAMUSCULAR; INTRAVENOUS PRN
Status: CANCELLED | OUTPATIENT
Start: 2024-07-19

## 2024-06-25 RX ORDER — DIPHENHYDRAMINE HYDROCHLORIDE 50 MG/ML
50 INJECTION INTRAMUSCULAR; INTRAVENOUS PRN
Status: CANCELLED | OUTPATIENT
Start: 2024-07-12

## 2024-06-25 RX ORDER — ONDANSETRON 2 MG/ML
4 INJECTION INTRAMUSCULAR; INTRAVENOUS PRN
Status: CANCELLED | OUTPATIENT
Start: 2024-07-12

## 2024-06-25 RX ORDER — 0.9 % SODIUM CHLORIDE 0.9 %
3 VIAL (ML) INJECTION PRN
Status: CANCELLED | OUTPATIENT
Start: 2024-07-26

## 2024-06-25 RX ORDER — PROCHLORPERAZINE MALEATE 10 MG
10 TABLET ORAL EVERY 6 HOURS PRN
OUTPATIENT
Start: 2024-08-02

## 2024-06-25 RX ORDER — ONDANSETRON 2 MG/ML
4 INJECTION INTRAMUSCULAR; INTRAVENOUS PRN
Status: CANCELLED | OUTPATIENT
Start: 2024-07-19

## 2024-06-25 RX ORDER — SODIUM CHLORIDE 9 MG/ML
INJECTION, SOLUTION INTRAVENOUS CONTINUOUS
Status: CANCELLED | OUTPATIENT
Start: 2024-07-26

## 2024-06-25 RX ORDER — 0.9 % SODIUM CHLORIDE 0.9 %
VIAL (ML) INJECTION PRN
Status: CANCELLED | OUTPATIENT
Start: 2024-07-19

## 2024-06-25 RX ORDER — PROCHLORPERAZINE MALEATE 10 MG
10 TABLET ORAL EVERY 6 HOURS PRN
Status: CANCELLED | OUTPATIENT
Start: 2024-07-26

## 2024-06-25 RX ORDER — PROCHLORPERAZINE MALEATE 10 MG
10 TABLET ORAL EVERY 6 HOURS PRN
Status: CANCELLED | OUTPATIENT
Start: 2024-07-12

## 2024-06-25 RX ORDER — EPINEPHRINE 1 MG/ML(1)
0.5 AMPUL (ML) INJECTION PRN
Status: CANCELLED | OUTPATIENT
Start: 2024-07-26

## 2024-06-25 RX ORDER — 0.9 % SODIUM CHLORIDE 0.9 %
VIAL (ML) INJECTION PRN
OUTPATIENT
Start: 2024-08-02

## 2024-06-25 RX ORDER — DIPHENHYDRAMINE HYDROCHLORIDE 50 MG/ML
50 INJECTION INTRAMUSCULAR; INTRAVENOUS PRN
Status: CANCELLED | OUTPATIENT
Start: 2024-07-26

## 2024-06-25 RX ORDER — ONDANSETRON 8 MG/1
8 TABLET, ORALLY DISINTEGRATING ORAL PRN
Status: CANCELLED | OUTPATIENT
Start: 2024-07-26

## 2024-06-25 RX ORDER — ONDANSETRON 8 MG/1
8 TABLET, ORALLY DISINTEGRATING ORAL PRN
Status: CANCELLED | OUTPATIENT
Start: 2024-07-12

## 2024-06-25 RX ORDER — DIPHENHYDRAMINE HYDROCHLORIDE 50 MG/ML
50 INJECTION INTRAMUSCULAR; INTRAVENOUS PRN
OUTPATIENT
Start: 2024-08-02

## 2024-06-25 RX ORDER — ACETAMINOPHEN 325 MG/1
650 TABLET ORAL ONCE
Status: CANCELLED | OUTPATIENT
Start: 2024-07-12 | End: 2024-07-09

## 2024-06-25 RX ORDER — 0.9 % SODIUM CHLORIDE 0.9 %
10 VIAL (ML) INJECTION PRN
OUTPATIENT
Start: 2024-08-02

## 2024-06-25 RX ORDER — EPINEPHRINE 1 MG/ML(1)
0.5 AMPUL (ML) INJECTION PRN
Status: CANCELLED | OUTPATIENT
Start: 2024-07-19

## 2024-06-25 RX ORDER — 0.9 % SODIUM CHLORIDE 0.9 %
10 VIAL (ML) INJECTION PRN
Status: CANCELLED | OUTPATIENT
Start: 2024-07-08

## 2024-06-25 RX ORDER — 0.9 % SODIUM CHLORIDE 0.9 %
VIAL (ML) INJECTION PRN
Status: CANCELLED | OUTPATIENT
Start: 2024-07-12

## 2024-06-25 RX ORDER — EPINEPHRINE 1 MG/ML(1)
0.5 AMPUL (ML) INJECTION PRN
OUTPATIENT
Start: 2024-08-02

## 2024-06-25 RX ORDER — ONDANSETRON 8 MG/1
8 TABLET, ORALLY DISINTEGRATING ORAL PRN
Status: CANCELLED | OUTPATIENT
Start: 2024-07-19

## 2024-06-25 RX ORDER — 0.9 % SODIUM CHLORIDE 0.9 %
VIAL (ML) INJECTION PRN
Status: CANCELLED | OUTPATIENT
Start: 2024-07-08

## 2024-06-25 RX ORDER — 0.9 % SODIUM CHLORIDE 0.9 %
3 VIAL (ML) INJECTION PRN
OUTPATIENT
Start: 2024-08-02

## 2024-06-25 RX ORDER — METHYLPREDNISOLONE SODIUM SUCCINATE 125 MG/2ML
125 INJECTION, POWDER, LYOPHILIZED, FOR SOLUTION INTRAMUSCULAR; INTRAVENOUS PRN
OUTPATIENT
Start: 2024-08-02

## 2024-06-25 ASSESSMENT — FIBROSIS 4 INDEX: FIB4 SCORE: 3.02

## 2024-07-11 ENCOUNTER — TELEPHONE (OUTPATIENT)
Dept: ONCOLOGY | Facility: MEDICAL CENTER | Age: 89
End: 2024-07-11
Payer: MEDICARE

## 2024-07-11 ENCOUNTER — OUTPATIENT INFUSION SERVICES (OUTPATIENT)
Dept: ONCOLOGY | Facility: MEDICAL CENTER | Age: 89
End: 2024-07-11
Attending: STUDENT IN AN ORGANIZED HEALTH CARE EDUCATION/TRAINING PROGRAM
Payer: MEDICARE

## 2024-07-11 VITALS
HEART RATE: 77 BPM | OXYGEN SATURATION: 96 % | DIASTOLIC BLOOD PRESSURE: 69 MMHG | RESPIRATION RATE: 18 BRPM | SYSTOLIC BLOOD PRESSURE: 131 MMHG | TEMPERATURE: 98 F

## 2024-07-11 DIAGNOSIS — C84.09 MYCOSIS FUNGOIDES INVOLVING EXTRANODAL SITE EXCLUDING SPLEEN AND OTHER SOLID ORGANS (HCC): ICD-10-CM

## 2024-07-11 LAB
ALBUMIN SERPL BCP-MCNC: 3.5 G/DL (ref 3.2–4.9)
ALBUMIN/GLOB SERPL: 1 G/DL
ALP SERPL-CCNC: 118 U/L (ref 30–99)
ALT SERPL-CCNC: 13 U/L (ref 2–50)
ANION GAP SERPL CALC-SCNC: 12 MMOL/L (ref 7–16)
AST SERPL-CCNC: 23 U/L (ref 12–45)
BASOPHILS # BLD AUTO: 0.8 % (ref 0–1.8)
BASOPHILS # BLD: 0.05 K/UL (ref 0–0.12)
BILIRUB SERPL-MCNC: 1.2 MG/DL (ref 0.1–1.5)
BUN SERPL-MCNC: 12 MG/DL (ref 8–22)
CALCIUM ALBUM COR SERPL-MCNC: 9.5 MG/DL (ref 8.5–10.5)
CALCIUM SERPL-MCNC: 9.1 MG/DL (ref 8.5–10.5)
CHLORIDE SERPL-SCNC: 102 MMOL/L (ref 96–112)
CO2 SERPL-SCNC: 25 MMOL/L (ref 20–33)
CREAT SERPL-MCNC: 1.01 MG/DL (ref 0.5–1.4)
EOSINOPHIL # BLD AUTO: 0.15 K/UL (ref 0–0.51)
EOSINOPHIL NFR BLD: 2.5 % (ref 0–6.9)
ERYTHROCYTE [DISTWIDTH] IN BLOOD BY AUTOMATED COUNT: 49.4 FL (ref 35.9–50)
GFR SERPLBLD CREATININE-BSD FMLA CKD-EPI: 71 ML/MIN/1.73 M 2
GLOBULIN SER CALC-MCNC: 3.4 G/DL (ref 1.9–3.5)
GLUCOSE SERPL-MCNC: 105 MG/DL (ref 65–99)
HCT VFR BLD AUTO: 43.4 % (ref 42–52)
HGB BLD-MCNC: 13.9 G/DL (ref 14–18)
IMM GRANULOCYTES # BLD AUTO: 0.01 K/UL (ref 0–0.11)
IMM GRANULOCYTES NFR BLD AUTO: 0.2 % (ref 0–0.9)
LYMPHOCYTES # BLD AUTO: 0.82 K/UL (ref 1–4.8)
LYMPHOCYTES NFR BLD: 13.7 % (ref 22–41)
MCH RBC QN AUTO: 29.2 PG (ref 27–33)
MCHC RBC AUTO-ENTMCNC: 32 G/DL (ref 32.3–36.5)
MCV RBC AUTO: 91.2 FL (ref 81.4–97.8)
MONOCYTES # BLD AUTO: 0.47 K/UL (ref 0–0.85)
MONOCYTES NFR BLD AUTO: 7.9 % (ref 0–13.4)
NEUTROPHILS # BLD AUTO: 4.48 K/UL (ref 1.82–7.42)
NEUTROPHILS NFR BLD: 74.9 % (ref 44–72)
NRBC # BLD AUTO: 0 K/UL
NRBC BLD-RTO: 0 /100 WBC (ref 0–0.2)
OUTPT INFUS CBC COMMENT OICOM: ABNORMAL
PLATELET # BLD AUTO: 166 K/UL (ref 164–446)
PMV BLD AUTO: 10.5 FL (ref 9–12.9)
POTASSIUM SERPL-SCNC: 4.4 MMOL/L (ref 3.6–5.5)
PROT SERPL-MCNC: 6.9 G/DL (ref 6–8.2)
RBC # BLD AUTO: 4.76 M/UL (ref 4.7–6.1)
SODIUM SERPL-SCNC: 139 MMOL/L (ref 135–145)
WBC # BLD AUTO: 6 K/UL (ref 4.8–10.8)

## 2024-07-11 PROCEDURE — 36415 COLL VENOUS BLD VENIPUNCTURE: CPT

## 2024-07-11 PROCEDURE — 80053 COMPREHEN METABOLIC PANEL: CPT

## 2024-07-11 PROCEDURE — 85025 COMPLETE CBC W/AUTO DIFF WBC: CPT

## 2024-07-12 ENCOUNTER — OUTPATIENT INFUSION SERVICES (OUTPATIENT)
Dept: ONCOLOGY | Facility: MEDICAL CENTER | Age: 89
End: 2024-07-12
Attending: STUDENT IN AN ORGANIZED HEALTH CARE EDUCATION/TRAINING PROGRAM
Payer: MEDICARE

## 2024-07-12 VITALS
DIASTOLIC BLOOD PRESSURE: 68 MMHG | RESPIRATION RATE: 18 BRPM | BODY MASS INDEX: 22.53 KG/M2 | WEIGHT: 160.94 LBS | SYSTOLIC BLOOD PRESSURE: 128 MMHG | OXYGEN SATURATION: 98 % | TEMPERATURE: 98 F | HEIGHT: 71 IN | HEART RATE: 80 BPM

## 2024-07-12 DIAGNOSIS — C84.09 MYCOSIS FUNGOIDES INVOLVING EXTRANODAL SITE EXCLUDING SPLEEN AND OTHER SOLID ORGANS (HCC): ICD-10-CM

## 2024-07-12 PROCEDURE — 96413 CHEMO IV INFUSION 1 HR: CPT

## 2024-07-12 PROCEDURE — 700102 HCHG RX REV CODE 250 W/ 637 OVERRIDE(OP): Performed by: STUDENT IN AN ORGANIZED HEALTH CARE EDUCATION/TRAINING PROGRAM

## 2024-07-12 PROCEDURE — 96375 TX/PRO/DX INJ NEW DRUG ADDON: CPT

## 2024-07-12 PROCEDURE — 700105 HCHG RX REV CODE 258: Performed by: STUDENT IN AN ORGANIZED HEALTH CARE EDUCATION/TRAINING PROGRAM

## 2024-07-12 PROCEDURE — 700111 HCHG RX REV CODE 636 W/ 250 OVERRIDE (IP): Mod: JZ | Performed by: STUDENT IN AN ORGANIZED HEALTH CARE EDUCATION/TRAINING PROGRAM

## 2024-07-12 PROCEDURE — A9270 NON-COVERED ITEM OR SERVICE: HCPCS | Performed by: STUDENT IN AN ORGANIZED HEALTH CARE EDUCATION/TRAINING PROGRAM

## 2024-07-12 RX ORDER — ACETAMINOPHEN 325 MG/1
650 TABLET ORAL ONCE
Status: COMPLETED | OUTPATIENT
Start: 2024-07-12 | End: 2024-07-12

## 2024-07-12 RX ADMIN — DIPHENHYDRAMINE HYDROCHLORIDE 50 MG: 50 INJECTION INTRAMUSCULAR; INTRAVENOUS at 14:54

## 2024-07-12 RX ADMIN — MOGAMULIZUMAB-KPKC 80 MG: 4 INJECTION INTRAVENOUS at 15:33

## 2024-07-12 RX ADMIN — ACETAMINOPHEN 650 MG: 325 TABLET ORAL at 14:53

## 2024-07-12 ASSESSMENT — FIBROSIS 4 INDEX: FIB4 SCORE: 3.38

## 2024-07-19 ENCOUNTER — OUTPATIENT INFUSION SERVICES (OUTPATIENT)
Dept: ONCOLOGY | Facility: MEDICAL CENTER | Age: 89
End: 2024-07-19
Attending: STUDENT IN AN ORGANIZED HEALTH CARE EDUCATION/TRAINING PROGRAM
Payer: MEDICARE

## 2024-07-19 VITALS
BODY MASS INDEX: 22.78 KG/M2 | HEIGHT: 71 IN | DIASTOLIC BLOOD PRESSURE: 68 MMHG | RESPIRATION RATE: 18 BRPM | WEIGHT: 162.7 LBS | HEART RATE: 79 BPM | SYSTOLIC BLOOD PRESSURE: 135 MMHG | OXYGEN SATURATION: 96 % | TEMPERATURE: 96.6 F

## 2024-07-19 DIAGNOSIS — C84.09 MYCOSIS FUNGOIDES INVOLVING EXTRANODAL SITE EXCLUDING SPLEEN AND OTHER SOLID ORGANS (HCC): ICD-10-CM

## 2024-07-19 PROCEDURE — 700105 HCHG RX REV CODE 258: Performed by: STUDENT IN AN ORGANIZED HEALTH CARE EDUCATION/TRAINING PROGRAM

## 2024-07-19 PROCEDURE — 700111 HCHG RX REV CODE 636 W/ 250 OVERRIDE (IP): Mod: JZ,JG | Performed by: STUDENT IN AN ORGANIZED HEALTH CARE EDUCATION/TRAINING PROGRAM

## 2024-07-19 PROCEDURE — 96413 CHEMO IV INFUSION 1 HR: CPT

## 2024-07-19 RX ADMIN — MOGAMULIZUMAB-KPKC 80 MG: 4 INJECTION INTRAVENOUS at 09:55

## 2024-07-19 ASSESSMENT — FIBROSIS 4 INDEX: FIB4 SCORE: 3.38

## 2024-07-26 ENCOUNTER — OUTPATIENT INFUSION SERVICES (OUTPATIENT)
Dept: ONCOLOGY | Facility: MEDICAL CENTER | Age: 89
End: 2024-07-26
Attending: STUDENT IN AN ORGANIZED HEALTH CARE EDUCATION/TRAINING PROGRAM
Payer: MEDICARE

## 2024-07-26 VITALS
RESPIRATION RATE: 18 BRPM | HEART RATE: 74 BPM | SYSTOLIC BLOOD PRESSURE: 132 MMHG | HEIGHT: 71 IN | OXYGEN SATURATION: 95 % | DIASTOLIC BLOOD PRESSURE: 67 MMHG | TEMPERATURE: 98 F | WEIGHT: 164.68 LBS | BODY MASS INDEX: 23.06 KG/M2

## 2024-07-26 DIAGNOSIS — C84.09 MYCOSIS FUNGOIDES INVOLVING EXTRANODAL SITE EXCLUDING SPLEEN AND OTHER SOLID ORGANS (HCC): ICD-10-CM

## 2024-07-26 PROCEDURE — 700111 HCHG RX REV CODE 636 W/ 250 OVERRIDE (IP): Mod: JZ,JG | Performed by: STUDENT IN AN ORGANIZED HEALTH CARE EDUCATION/TRAINING PROGRAM

## 2024-07-26 PROCEDURE — 96413 CHEMO IV INFUSION 1 HR: CPT

## 2024-07-26 PROCEDURE — 700105 HCHG RX REV CODE 258: Performed by: STUDENT IN AN ORGANIZED HEALTH CARE EDUCATION/TRAINING PROGRAM

## 2024-07-26 RX ADMIN — MOGAMULIZUMAB-KPKC 80 MG: 4 INJECTION INTRAVENOUS at 15:20

## 2024-07-26 ASSESSMENT — FIBROSIS 4 INDEX: FIB4 SCORE: 3.38

## 2024-08-02 ENCOUNTER — OUTPATIENT INFUSION SERVICES (OUTPATIENT)
Dept: ONCOLOGY | Facility: MEDICAL CENTER | Age: 89
End: 2024-08-02
Attending: STUDENT IN AN ORGANIZED HEALTH CARE EDUCATION/TRAINING PROGRAM
Payer: MEDICARE

## 2024-08-02 VITALS
SYSTOLIC BLOOD PRESSURE: 123 MMHG | DIASTOLIC BLOOD PRESSURE: 69 MMHG | BODY MASS INDEX: 23.3 KG/M2 | OXYGEN SATURATION: 90 % | HEIGHT: 71 IN | HEART RATE: 87 BPM | RESPIRATION RATE: 18 BRPM | TEMPERATURE: 97.7 F | WEIGHT: 166.45 LBS

## 2024-08-02 DIAGNOSIS — C84.09 MYCOSIS FUNGOIDES INVOLVING EXTRANODAL SITE EXCLUDING SPLEEN AND OTHER SOLID ORGANS (HCC): ICD-10-CM

## 2024-08-02 PROCEDURE — 700111 HCHG RX REV CODE 636 W/ 250 OVERRIDE (IP): Mod: JZ,JG | Performed by: STUDENT IN AN ORGANIZED HEALTH CARE EDUCATION/TRAINING PROGRAM

## 2024-08-02 PROCEDURE — 700105 HCHG RX REV CODE 258: Performed by: STUDENT IN AN ORGANIZED HEALTH CARE EDUCATION/TRAINING PROGRAM

## 2024-08-02 PROCEDURE — 96413 CHEMO IV INFUSION 1 HR: CPT

## 2024-08-02 RX ORDER — SODIUM CHLORIDE 9 MG/ML
INJECTION, SOLUTION INTRAVENOUS CONTINUOUS
Status: DISCONTINUED | OUTPATIENT
Start: 2024-08-02 | End: 2024-08-02 | Stop reason: HOSPADM

## 2024-08-02 RX ADMIN — MOGAMULIZUMAB-KPKC 80 MG: 4 INJECTION INTRAVENOUS at 14:44

## 2024-08-02 ASSESSMENT — FIBROSIS 4 INDEX: FIB4 SCORE: 3.38

## 2024-08-02 NOTE — PROGRESS NOTES
"Pharmacy Chemotherapy Calculation:    Dx: Mycosis Fungoides/Sezary Syndrome        Protocol: Mogamulizumab-kpkc     *Dosing Reference*  Mogamulizumab-kpkc 1 mg/kg IV over 60 minutes weekly on Days 1, 8, 15, and 22   28-day cycle for 1 cycle  ~Followed by~  Mogamulizumab-kpkc 1 mg/kg IV over 60 minutes on Days 1 and 15  28-day cycle until disease progression or unacceptable toxicity     NCCN Guidelines® for Primary Cutaneous Lymphomas V.1.2024.   Tracey YCRISTOBAL, et al. Lancet Oncol. 2018;19(9):1099-7579.a    Allergies:  Gold and Silver       /69   Pulse 87   Temp 36.5 °C (97.7 °F) (Temporal)   Resp 18   Ht 1.8 m (5' 10.87\")   Wt 75.5 kg (166 lb 7.2 oz)   SpO2 90%   BMI 23.30 kg/m²  Body surface area is 1.94 meters squared.    No lab parameters      Latest Reference Range & Units 06/20/24 10:59   Hep B Surface Antibody Quant 0.00 - 10.00 mIU/mL 207.00 (H)   Hepatitis B Surface Antigen Non-Reactive  Non-Reactive   Hepatitis B Core Ab, Total Non-Reactive  Reactive !   Hepatitis C Antibody Non-Reactive  Non-Reactive     Drug Order   (Drug name, dose, route, IV Fluid & volume, frequency, number of doses) Cycle 1 Day 15  Previous treatment: C1D15 7/26/24     Medication = Mogamulizumab-kpkc  Base Dose = 1 mg/kg  Calc Dose: Base Dose x 75.5 kg = 75.5 mg  Final Dose = 80 mg  Route = IV  Fluid & Volume =  mL  Admin Duration = Over 60 minutes          <10% difference, OK to treat with final dose     By my signature below, I confirm this process was performed independently with the BSA and all final chemotherapy dosing calculations congruent. I have reviewed the above chemotherapy order and that my calculation of the final dose and BSA (when applicable) corroborate those calculations of the  pharmacist. Discrepancies of 10% or greater in the written dose have been addressed and documented within the [facility] Progress notes.    Maren Dorantes, PharmD  "

## 2024-08-02 NOTE — PROGRESS NOTES
Pt arrived ambulatory to [facility] for D22C1 Poteligeo infusion for Mycosis Fungoides. POC discussed with pt and he agrees with plan.     PIV established, brisk blood return noted. Pt medicated per MAR. Poteligeo infused over 1 hour. Pt tolerated treatment without s/s adverse reaction. PIV dc'd catheter tip intact, gauze and coban dressing applied.     Pt discharged to self care, [facility]. Pt's next appointment confirmed 8/8/2024.

## 2024-08-02 NOTE — PROGRESS NOTES
Chemotherapy Verification - SECONDARY RN       Height = 1.8m  Weight = 75.5kg  BSA = 1.94m2       Medication: Mogamulizumab -[facility] Dose: 1mg/kg  Calculated Dose: 75.5mg                             (In mg/m2, AUC, mg/kg)         I confirm that this process was performed independently.

## 2024-08-02 NOTE — PROGRESS NOTES
"Pharmacy Chemotherapy Calculation:    Dx: Mycosis Fungoides/Sezary Syndrome      Cycle 1, Day 22  Previous treatment = C1D15 7/26/24    Protocol: Mogamulizumab-kpkc     *Dosing Reference*  Mogamulizumab-kpkc 1 mg/kg IV over 60 minutes weekly on Days 1, 8, 15, and 22   28-day cycle for 1 cycle  ~Followed by~  Mogamulizumab-kpkc 1 mg/kg IV over 60 minutes on Days 1 and 15  28-day cycle until disease progression or unacceptable toxicity   NCCN Guidelines® for Primary Cutaneous Lymphomas V.1.2024.   Tracey VARGAS, et al. Lancet Oncol. 2018;19(9):4021-2895.a    Allergies: Gold and Silver  /69   Pulse 87   Temp 36.5 °C (97.7 °F) (Temporal)   Resp 18   Ht 1.8 m (5' 10.87\")   Wt 75.5 kg (166 lb 7.2 oz)   SpO2 90%   BMI 23.30 kg/m²   Body surface area is 1.94 meters squared.     06/20/24 10:59   Hep B Surface Antibody Quant 207.00 (H)   Hepatitis B Surface Antigen Non-Reactive   Hepatitis B Core Ab, Total Reactive !   Results indicative of immunity due to natural infection    Mogamulizumab 1 mg/kg x 75.5 kg = 75.5 mg   <10% difference, okay to treat with final dose = 80 mg IV    Taylor Carcamo, ElodiaD    "

## 2024-08-02 NOTE — PROGRESS NOTES
Chemotherapy Verification - PRIMARY RN    D22C1    Height = 180cm  Weight = 75.5kg  BSA = 1.94m^2       Medication: Poteligeo  Dose: 1mg/kg  Calculated Dose: 75.5 mg                                   I confirm this process was performed independently with the BSA and all final chemotherapy dosing calculations congruent.  Any discrepancies of 10% or greater have been addressed with the chemotherapy pharmacist. The resolution of the discrepancy has been documented in the EPIC progress notes.

## 2024-08-05 RX ORDER — 0.9 % SODIUM CHLORIDE 0.9 %
3 VIAL (ML) INJECTION PRN
Status: CANCELLED | OUTPATIENT
Start: 2024-08-09

## 2024-08-05 RX ORDER — METHYLPREDNISOLONE SODIUM SUCCINATE 125 MG/2ML
125 INJECTION, POWDER, LYOPHILIZED, FOR SOLUTION INTRAMUSCULAR; INTRAVENOUS PRN
Status: CANCELLED | OUTPATIENT
Start: 2024-08-23

## 2024-08-05 RX ORDER — DIPHENHYDRAMINE HYDROCHLORIDE 50 MG/ML
50 INJECTION INTRAMUSCULAR; INTRAVENOUS PRN
Status: CANCELLED | OUTPATIENT
Start: 2024-08-09

## 2024-08-05 RX ORDER — 0.9 % SODIUM CHLORIDE 0.9 %
10 VIAL (ML) INJECTION PRN
Status: CANCELLED | OUTPATIENT
Start: 2024-08-23

## 2024-08-05 RX ORDER — 0.9 % SODIUM CHLORIDE 0.9 %
10 VIAL (ML) INJECTION PRN
Status: CANCELLED | OUTPATIENT
Start: 2024-08-08

## 2024-08-05 RX ORDER — 0.9 % SODIUM CHLORIDE 0.9 %
VIAL (ML) INJECTION PRN
Status: CANCELLED | OUTPATIENT
Start: 2024-08-08

## 2024-08-05 RX ORDER — EPINEPHRINE 1 MG/ML(1)
0.5 AMPUL (ML) INJECTION PRN
Status: CANCELLED | OUTPATIENT
Start: 2024-08-23

## 2024-08-05 RX ORDER — PROCHLORPERAZINE MALEATE 10 MG
10 TABLET ORAL EVERY 6 HOURS PRN
Status: CANCELLED | OUTPATIENT
Start: 2024-08-09

## 2024-08-05 RX ORDER — ONDANSETRON 2 MG/ML
4 INJECTION INTRAMUSCULAR; INTRAVENOUS PRN
Status: CANCELLED | OUTPATIENT
Start: 2024-08-23

## 2024-08-05 RX ORDER — SODIUM CHLORIDE 9 MG/ML
INJECTION, SOLUTION INTRAVENOUS CONTINUOUS
Status: CANCELLED | OUTPATIENT
Start: 2024-08-23

## 2024-08-05 RX ORDER — 0.9 % SODIUM CHLORIDE 0.9 %
10 VIAL (ML) INJECTION PRN
Status: CANCELLED | OUTPATIENT
Start: 2024-08-09

## 2024-08-05 RX ORDER — ONDANSETRON 8 MG/1
8 TABLET, ORALLY DISINTEGRATING ORAL PRN
Status: CANCELLED | OUTPATIENT
Start: 2024-08-09

## 2024-08-05 RX ORDER — METHYLPREDNISOLONE SODIUM SUCCINATE 125 MG/2ML
125 INJECTION, POWDER, LYOPHILIZED, FOR SOLUTION INTRAMUSCULAR; INTRAVENOUS PRN
Status: CANCELLED | OUTPATIENT
Start: 2024-08-09

## 2024-08-05 RX ORDER — 0.9 % SODIUM CHLORIDE 0.9 %
VIAL (ML) INJECTION PRN
Status: CANCELLED | OUTPATIENT
Start: 2024-08-23

## 2024-08-05 RX ORDER — EPINEPHRINE 1 MG/ML(1)
0.5 AMPUL (ML) INJECTION PRN
Status: CANCELLED | OUTPATIENT
Start: 2024-08-09

## 2024-08-05 RX ORDER — ONDANSETRON 8 MG/1
8 TABLET, ORALLY DISINTEGRATING ORAL PRN
Status: CANCELLED | OUTPATIENT
Start: 2024-08-23

## 2024-08-05 RX ORDER — 0.9 % SODIUM CHLORIDE 0.9 %
VIAL (ML) INJECTION PRN
Status: CANCELLED | OUTPATIENT
Start: 2024-08-09

## 2024-08-05 RX ORDER — PROCHLORPERAZINE MALEATE 10 MG
10 TABLET ORAL EVERY 6 HOURS PRN
Status: CANCELLED | OUTPATIENT
Start: 2024-08-23

## 2024-08-05 RX ORDER — 0.9 % SODIUM CHLORIDE 0.9 %
3 VIAL (ML) INJECTION PRN
Status: CANCELLED | OUTPATIENT
Start: 2024-08-08

## 2024-08-05 RX ORDER — SODIUM CHLORIDE 9 MG/ML
INJECTION, SOLUTION INTRAVENOUS CONTINUOUS
Status: CANCELLED | OUTPATIENT
Start: 2024-08-09

## 2024-08-05 RX ORDER — ONDANSETRON 2 MG/ML
4 INJECTION INTRAMUSCULAR; INTRAVENOUS PRN
Status: CANCELLED | OUTPATIENT
Start: 2024-08-09

## 2024-08-05 RX ORDER — DIPHENHYDRAMINE HYDROCHLORIDE 50 MG/ML
50 INJECTION INTRAMUSCULAR; INTRAVENOUS PRN
Status: CANCELLED | OUTPATIENT
Start: 2024-08-23

## 2024-08-05 RX ORDER — 0.9 % SODIUM CHLORIDE 0.9 %
3 VIAL (ML) INJECTION PRN
Status: CANCELLED | OUTPATIENT
Start: 2024-08-23

## 2024-08-06 NOTE — PROGRESS NOTES
"Pharmacy Chemotherapy Calculation:    Dx: Mycosis Fungoides/Sezary Syndrome      Cycle 2, Day 1  Previous treatment = C1D22 8/2/24    Protocol: Mogamulizumab-kpkc     *Dosing Reference*  Mogamulizumab-kpkc 1 mg/kg IV over 60 minutes weekly on Days 1, 8, 15, and 22   28-day cycle for 1 cycle  ~Followed by~  Mogamulizumab-kpkc 1 mg/kg IV over 60 minutes on Days 1 and 15  28-day cycle until disease progression or unacceptable toxicity   NCCN Guidelines® for Primary Cutaneous Lymphomas V.1.2024.   Tracey VARGAS et al. Lancet Oncol. 2018;19(9):3467-5662.a    Allergies: Gold and Silver  /66   Pulse 71   Temp 35.9 °C (96.7 °F) (Temporal)   Resp 18   Ht 1.8 m (5' 10.87\")   Wt 73.5 kg (162 lb 0.6 oz)   SpO2 97%   BMI 22.69 kg/m²   Body surface area is 1.92 meters squared.     06/20/24 10:59   Hep B Surface Antibody Quant 207.00 (H)   Hepatitis B Surface Antigen Non-Reactive   Hepatitis B Core Ab, Total Reactive !   Results indicative of immunity due to natural infection    Date 8/8/24 :  ANC = ~ 5000  Plt= 213k   Hgb= 14    Mogamulizumab 1 mg/kg x 73.5 kg = 73.5 mg   <10% difference, okay to treat with final dose = 74 mg IV    Danyelle Pompa, PharmD    "

## 2024-08-08 ENCOUNTER — OUTPATIENT INFUSION SERVICES (OUTPATIENT)
Dept: ONCOLOGY | Facility: MEDICAL CENTER | Age: 89
End: 2024-08-08
Attending: STUDENT IN AN ORGANIZED HEALTH CARE EDUCATION/TRAINING PROGRAM
Payer: MEDICARE

## 2024-08-08 VITALS
OXYGEN SATURATION: 95 % | RESPIRATION RATE: 18 BRPM | SYSTOLIC BLOOD PRESSURE: 129 MMHG | DIASTOLIC BLOOD PRESSURE: 79 MMHG | HEART RATE: 99 BPM | TEMPERATURE: 97.7 F

## 2024-08-08 DIAGNOSIS — C84.09 MYCOSIS FUNGOIDES INVOLVING EXTRANODAL SITE EXCLUDING SPLEEN AND OTHER SOLID ORGANS (HCC): ICD-10-CM

## 2024-08-08 LAB
ALBUMIN SERPL BCP-MCNC: 3.9 G/DL (ref 3.2–4.9)
ALBUMIN/GLOB SERPL: 1 G/DL
ALP SERPL-CCNC: 158 U/L (ref 30–99)
ALT SERPL-CCNC: 8 U/L (ref 2–50)
ANION GAP SERPL CALC-SCNC: 11 MMOL/L (ref 7–16)
AST SERPL-CCNC: 21 U/L (ref 12–45)
BASOPHILS # BLD AUTO: 1.2 % (ref 0–1.8)
BASOPHILS # BLD: 0.08 K/UL (ref 0–0.12)
BILIRUB SERPL-MCNC: 1.3 MG/DL (ref 0.1–1.5)
BUN SERPL-MCNC: 14 MG/DL (ref 8–22)
CALCIUM ALBUM COR SERPL-MCNC: 9.9 MG/DL (ref 8.5–10.5)
CALCIUM SERPL-MCNC: 9.8 MG/DL (ref 8.5–10.5)
CHLORIDE SERPL-SCNC: 103 MMOL/L (ref 96–112)
CO2 SERPL-SCNC: 25 MMOL/L (ref 20–33)
CREAT SERPL-MCNC: 1.38 MG/DL (ref 0.5–1.4)
EOSINOPHIL # BLD AUTO: 0.22 K/UL (ref 0–0.51)
EOSINOPHIL NFR BLD: 3.3 % (ref 0–6.9)
ERYTHROCYTE [DISTWIDTH] IN BLOOD BY AUTOMATED COUNT: 49.5 FL (ref 35.9–50)
GFR SERPLBLD CREATININE-BSD FMLA CKD-EPI: 49 ML/MIN/1.73 M 2
GLOBULIN SER CALC-MCNC: 3.9 G/DL (ref 1.9–3.5)
GLUCOSE SERPL-MCNC: 103 MG/DL (ref 65–99)
HCT VFR BLD AUTO: 43.5 % (ref 42–52)
HGB BLD-MCNC: 14 G/DL (ref 14–18)
IMM GRANULOCYTES # BLD AUTO: 0.02 K/UL (ref 0–0.11)
IMM GRANULOCYTES NFR BLD AUTO: 0.3 % (ref 0–0.9)
LYMPHOCYTES # BLD AUTO: 0.68 K/UL (ref 1–4.8)
LYMPHOCYTES NFR BLD: 10.4 % (ref 22–41)
MCH RBC QN AUTO: 29.2 PG (ref 27–33)
MCHC RBC AUTO-ENTMCNC: 32.2 G/DL (ref 32.3–36.5)
MCV RBC AUTO: 90.8 FL (ref 81.4–97.8)
MONOCYTES # BLD AUTO: 0.57 K/UL (ref 0–0.85)
MONOCYTES NFR BLD AUTO: 8.7 % (ref 0–13.4)
NEUTROPHILS # BLD AUTO: 5 K/UL (ref 1.82–7.42)
NEUTROPHILS NFR BLD: 76.1 % (ref 44–72)
NRBC # BLD AUTO: 0 K/UL
NRBC BLD-RTO: 0 /100 WBC (ref 0–0.2)
OUTPT INFUS CBC COMMENT OICOM: ABNORMAL
PLATELET # BLD AUTO: 213 K/UL (ref 164–446)
PMV BLD AUTO: 9.7 FL (ref 9–12.9)
POTASSIUM SERPL-SCNC: 5 MMOL/L (ref 3.6–5.5)
PROT SERPL-MCNC: 7.8 G/DL (ref 6–8.2)
RBC # BLD AUTO: 4.79 M/UL (ref 4.7–6.1)
SODIUM SERPL-SCNC: 139 MMOL/L (ref 135–145)
WBC # BLD AUTO: 6.6 K/UL (ref 4.8–10.8)

## 2024-08-08 PROCEDURE — 36415 COLL VENOUS BLD VENIPUNCTURE: CPT

## 2024-08-08 PROCEDURE — 80053 COMPREHEN METABOLIC PANEL: CPT

## 2024-08-08 PROCEDURE — 85025 COMPLETE CBC W/AUTO DIFF WBC: CPT

## 2024-08-08 NOTE — PROGRESS NOTES
Mac came into infusion services today for pre-chemo labs. No complaints. Labs drawn from the left AC, covered with gauze and coban. Pt has future appointments. Discharged to self care.    Labs reviewed and within parameters for treatment tomorrow 8/9/24

## 2024-08-09 ENCOUNTER — OUTPATIENT INFUSION SERVICES (OUTPATIENT)
Dept: ONCOLOGY | Facility: MEDICAL CENTER | Age: 89
End: 2024-08-09
Attending: STUDENT IN AN ORGANIZED HEALTH CARE EDUCATION/TRAINING PROGRAM
Payer: MEDICARE

## 2024-08-09 VITALS
HEART RATE: 71 BPM | BODY MASS INDEX: 22.69 KG/M2 | OXYGEN SATURATION: 97 % | RESPIRATION RATE: 18 BRPM | DIASTOLIC BLOOD PRESSURE: 66 MMHG | WEIGHT: 162.04 LBS | HEIGHT: 71 IN | TEMPERATURE: 96.7 F | SYSTOLIC BLOOD PRESSURE: 126 MMHG

## 2024-08-09 DIAGNOSIS — C84.09 MYCOSIS FUNGOIDES INVOLVING EXTRANODAL SITE EXCLUDING SPLEEN AND OTHER SOLID ORGANS (HCC): ICD-10-CM

## 2024-08-09 PROCEDURE — 700105 HCHG RX REV CODE 258: Performed by: STUDENT IN AN ORGANIZED HEALTH CARE EDUCATION/TRAINING PROGRAM

## 2024-08-09 PROCEDURE — 700111 HCHG RX REV CODE 636 W/ 250 OVERRIDE (IP): Mod: JW,JG | Performed by: STUDENT IN AN ORGANIZED HEALTH CARE EDUCATION/TRAINING PROGRAM

## 2024-08-09 PROCEDURE — 96413 CHEMO IV INFUSION 1 HR: CPT

## 2024-08-09 RX ADMIN — MOGAMULIZUMAB-KPKC 74 MG: 4 INJECTION INTRAVENOUS at 14:16

## 2024-08-09 ASSESSMENT — FIBROSIS 4 INDEX: FIB4 SCORE: 3.07

## 2024-08-09 NOTE — PROGRESS NOTES
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 POC discussed and pt verbalized understanding. PIV established without difficulty, brisk blood return noted, line flushes with ease. Pt tolerated well. Mogamulizumab administered per MAR. Pt tolerated well. No s/s of adverse reactions or complications noted. PIV flushed, removed with tip intact and site covered with sterile gauze/coban. Educated pt on when to contact provider including fever, s/s of infection, worsening symptoms and/or defer judgment to ED for provider evaluation. Pt verbalized understanding. Pt confirmed next appt and discharged to self care. Pt in NAD at time of discharge.

## 2024-08-09 NOTE — PROGRESS NOTES
Chemotherapy Verification - PRIMARY RN      Height = 1.8m  Weight = 73.5kg  BSA = 1.92 m^2       Medication: mogamulizumab-[facility] (Poteligeo) Dose: 1mg/kg  Calculated Dose: 73.5 mg                             (In mg/m2, AUC, mg/kg)       I confirm this process was performed independently with the BSA and all final chemotherapy dosing calculations congruent.  Any discrepancies of 10% or greater have been addressed with the chemotherapy pharmacist. The resolution of the discrepancy has been documented in the EPIC progress notes.

## 2024-08-09 NOTE — PROGRESS NOTES
Chemotherapy Verification - SECONDARY RN       Height = 1.8 m   Weight = 73.5 kg  BSA = 1.92 m2       Medication: Mogamulizumab-[facility]  Dose: 1 mg/kg  Calculated Dose: 73.5 mg                             (In mg/m2, AUC, mg/kg)     I confirm that this process was performed independently.

## 2024-08-09 NOTE — PROGRESS NOTES
"Pharmacy Chemotherapy Calculation:    Dx: Mycosis Fungoides/Sezary Syndrome        Protocol: Mogamulizumab-kpkc     *Dosing Reference*  Mogamulizumab-kpkc 1 mg/kg IV over 60 minutes weekly on Days 1, 8, 15, and 22   28-day cycle for 1 cycle  ~Followed by~  Mogamulizumab-kpkc 1 mg/kg IV over 60 minutes on Days 1 and 15  28-day cycle until disease progression or unacceptable toxicity     NCCN Guidelines® for Primary Cutaneous Lymphomas V.1.2024.   Tracey YCRISTOBAL, et al. Lancet Oncol. 2018;19(9):2739-4098.a    Allergies: Gold and Silver       /66   Pulse 71   Temp 35.9 °C (96.7 °F) (Temporal)   Resp 18   Ht 1.8 m (5' 10.87\")   Wt 73.5 kg (162 lb 0.6 oz)   SpO2 97%   BMI 22.69 kg/m²  Body surface area is 1.92 meters squared.    Labs 8/8/24:  ANC~ 5000 Plt = 213k   Hgb = 14         Latest Reference Range & Units 06/20/24 10:59   Hep B Surface Antibody Quant 0.00 - 10.00 mIU/mL 207.00 (H)   Hepatitis B Surface Antigen Non-Reactive  Non-Reactive   Hepatitis B Core Ab, Total Non-Reactive  Reactive !   Hepatitis C Antibody Non-Reactive  Non-Reactive     Drug Order   (Drug name, dose, route, IV Fluid & volume, frequency, number of doses) Cycle 2 Day 1  Previous treatment: C1D22 8/2/24     Medication = Mogamulizumab-kpkc  Base Dose = 1 mg/kg  Calc Dose: Base Dose x 73.5 kg = 73.5 mg  Final Dose = 74 mg  Route = IV  Fluid & Volume =  mL  Admin Duration = Over 60 minutes          <10% difference, OK to treat with final dose     By my signature below, I confirm this process was performed independently with the BSA and all final chemotherapy dosing calculations congruent. I have reviewed the above chemotherapy order and that my calculation of the final dose and BSA (when applicable) corroborate those calculations of the  pharmacist. Discrepancies of 10% or greater in the written dose have been addressed and documented within the [facility] Progress notes.    Taylor Carcamo, PharmD  "

## 2024-08-16 ENCOUNTER — OUTPATIENT INFUSION SERVICES (OUTPATIENT)
Dept: ONCOLOGY | Facility: MEDICAL CENTER | Age: 89
End: 2024-08-16
Attending: STUDENT IN AN ORGANIZED HEALTH CARE EDUCATION/TRAINING PROGRAM
Payer: MEDICARE

## 2024-08-17 NOTE — PROGRESS NOTES
PharmENDY Ayala realized pt was scheduled incorrectly prior to arrival. Cycle 2 and subsequent do not have D8 and D22, D1 and D15 only. Unfortunately unable to reach pt before he arrived. Supervisor Suze explained situation to pt, he was kindly very understanding. He knows appt next week is still in place, erroneous D 22 also cancelled.

## 2024-08-23 ENCOUNTER — OUTPATIENT INFUSION SERVICES (OUTPATIENT)
Dept: ONCOLOGY | Facility: MEDICAL CENTER | Age: 89
End: 2024-08-23
Attending: STUDENT IN AN ORGANIZED HEALTH CARE EDUCATION/TRAINING PROGRAM
Payer: MEDICARE

## 2024-08-23 VITALS
OXYGEN SATURATION: 94 % | HEART RATE: 80 BPM | RESPIRATION RATE: 20 BRPM | HEIGHT: 71 IN | BODY MASS INDEX: 22.59 KG/M2 | SYSTOLIC BLOOD PRESSURE: 137 MMHG | TEMPERATURE: 96.8 F | DIASTOLIC BLOOD PRESSURE: 74 MMHG | WEIGHT: 161.38 LBS

## 2024-08-23 DIAGNOSIS — C84.09 MYCOSIS FUNGOIDES INVOLVING EXTRANODAL SITE EXCLUDING SPLEEN AND OTHER SOLID ORGANS (HCC): ICD-10-CM

## 2024-08-23 PROCEDURE — 96413 CHEMO IV INFUSION 1 HR: CPT

## 2024-08-23 PROCEDURE — 700105 HCHG RX REV CODE 258: Performed by: STUDENT IN AN ORGANIZED HEALTH CARE EDUCATION/TRAINING PROGRAM

## 2024-08-23 PROCEDURE — 700111 HCHG RX REV CODE 636 W/ 250 OVERRIDE (IP): Mod: JZ,JG | Performed by: STUDENT IN AN ORGANIZED HEALTH CARE EDUCATION/TRAINING PROGRAM

## 2024-08-23 RX ADMIN — MOGAMULIZUMAB-KPKC 80 MG: 4 INJECTION INTRAVENOUS at 14:24

## 2024-08-23 ASSESSMENT — FIBROSIS 4 INDEX: FIB4 SCORE: 3.07

## 2024-08-23 NOTE — PROGRESS NOTES
Chemotherapy Verification - SECONDARY RN       Height = 180 cm  Weight = 73.2 kg  BSA = 1.91 m2       Medication: Poteligeo  Dose: 1 mg/kg  Calculated Dose: 73.2 mg                             (In mg/m2, AUC, mg/kg)       I confirm that this process was performed independently.

## 2024-08-23 NOTE — PROGRESS NOTES
Chemotherapy Verification - PRIMARY RN      Height = 1.8 m  Weight = 73.2 kg  BSA = 1.91 m2       Medication: Mogamulizumab-[facility]  Dose: 1 mg/kg  Calculated Dose: 73.2 mg                             (In mg/m2, AUC, mg/kg)       I confirm this process was performed independently with the BSA and all final chemotherapy dosing calculations congruent.  Any discrepancies of 10% or greater have been addressed with the chemotherapy pharmacist. The resolution of the discrepancy has been documented in the EPIC progress notes.

## 2024-08-23 NOTE — PROGRESS NOTES
"Pharmacy Chemotherapy Calculation:    Dx: Mycosis Fungoides/Sezary Syndrome      Cycle 2, Day 15  Previous treatment = C2D15 8/9/24    Protocol: Mogamulizumab-kpkc     *Dosing Reference*  Mogamulizumab-kpkc 1 mg/kg IV over 60 minutes weekly on Days 1, 8, 15, and 22   28-day cycle for 1 cycle  ~Followed by~  Mogamulizumab-kpkc 1 mg/kg IV over 60 minutes on Days 1 and 15  28-day cycle until disease progression or unacceptable toxicity   NCCN Guidelines® for Primary Cutaneous Lymphomas V.1.2024.   Tracey VARGAS, et al. Lancet Oncol. 2018;19(9):7679-3495.a    Allergies: Gold and Silver  /74   Pulse 80   Temp 36 °C (96.8 °F) (Temporal)   Resp 20   Ht 1.8 m (5' 10.87\")   Wt 73.2 kg (161 lb 6 oz)   SpO2 94%   BMI 22.59 kg/m²   Body surface area is 1.91 meters squared.     06/20/24 10:59   Hep B Surface Antibody Quant 207.00 (H)   Hepatitis B Surface Antigen Non-Reactive   Hepatitis B Core Ab, Total Reactive !   Results indicative of immunity due to natural infection    No lab parameters    Mogamulizumab 1 mg/kg x 73.2 kg = 73.2 mg   <10% difference, okay to treat with final dose = 80 mg IV    Maren Dorantes, PharmD    "

## 2024-08-23 NOTE — PROGRESS NOTES
"Pharmacy Chemotherapy Calculation:    Dx: Mycosis Fungoides/Sezary Syndrome        Protocol: Mogamulizumab-kpkc     *Dosing Reference*  Mogamulizumab-kpkc 1 mg/kg IV over 60 minutes weekly on Days 1, 8, 15, and 22   28-day cycle for 1 cycle  ~Followed by~  Mogamulizumab-kpkc 1 mg/kg IV over 60 minutes on Days 1 and 15  28-day cycle until disease progression or unacceptable toxicity     NCCN Guidelines® for Primary Cutaneous Lymphomas V.1.2024.   Tracey VARGAS, et al. Lancet Oncol. 2018;19(9):3990-4654.a    Allergies: Gold and Silver       /74   Pulse 80   Temp 36 °C (96.8 °F) (Temporal)   Resp 20   Ht 1.8 m (5' 10.87\")   Wt 73.2 kg (161 lb 6 oz)   SpO2 94%   BMI 22.59 kg/m²  Body surface area is 1.91 meters squared.    No hold parameters for Day 15.      06/20/24 10:59   Hep B Surface Antibody Quant 207.00 (H)   Hepatitis B Surface Antigen Non-Reactive   Hepatitis B Core Ab, Total Reactive !   Hepatitis C Antibody Non-Reactive     Drug Order   (Drug name, dose, route, IV Fluid & volume, frequency, number of doses) Cycle 2 Day 15  Previous treatment: C2D1 8/2/24     Medication = Mogamulizumab-kpkc  Base Dose = 1 mg/kg  Calc Dose: Base Dose x 73.2 kg = 73.2 mg  Final Dose = 80 mg  Route = IV  Fluid & Volume =  mL  Admin Duration = Over 60 minutes          Rounded to vial size (within 10%) per dose rounding protocol, OK to treat with final dose     By my signature below, I confirm this process was performed independently with the BSA and all final chemotherapy dosing calculations congruent. I have reviewed the above chemotherapy order and that my calculation of the final dose and BSA (when applicable) corroborate those calculations of the  pharmacist. Discrepancies of 10% or greater in the written dose have been addressed and documented within the [facility] Progress notes.    Caty Ley, PharmD  "

## 2024-08-23 NOTE — PROGRESS NOTES
Mac presents to infusion for cycle 2/ day 15 of Poteligeo. He reports bleeding from his tumor but tolerating past treatments well. PIV started with positive return.    Labs reviewed by RN, within parameters for treatment today.     Pre-treatment meds given as ordered.     Mogamulizumab given over 1 hour.  Patient tolerated well with no adverse effects.     Upon inspection of patients bleeding, he states that he has left his gauze and tape on for two days. The bandage was hard and stuck to his skin. He asked if we could aid in changing his bandages since he has difficulty doing so. His bandages had to be soaked in saline to be taken off which he states that he usually rips off the bandage in the shower when it is wet and lets the blood drip off of it in the shower. Sterile gauze and hypafix tape were placed on the open sites. Dr. Power was contacted about the situation to see if they could refer the patient to a wound clinic to avoid infection and proper management of his site.     PIV flushed post infusion with positive blood return, removed with tip intact. Patient left in stable condition, knows when to return for next appointment.

## 2024-08-26 DIAGNOSIS — I48.19 PERSISTENT ATRIAL FIBRILLATION (HCC): ICD-10-CM

## 2024-08-26 DIAGNOSIS — I27.20 PULMONARY HYPERTENSION (HCC): ICD-10-CM

## 2024-08-26 DIAGNOSIS — Z86.79 HISTORY OF HEART FAILURE: ICD-10-CM

## 2024-08-26 NOTE — TELEPHONE ENCOUNTER
Received request via: Patient    Was the patient seen in the last year in this department? Yes    Does the patient have an active prescription (recently filled or refills available) for medication(s) requested?  Patient has 3 days left    Pharmacy Name: [facility]     Does the patient have [facility] and need 100-day supply? (This applies to ALL medications) Patient does not have SCP    Thank you  Karen ISAAC

## 2024-08-27 RX ORDER — FUROSEMIDE 20 MG
20 TABLET ORAL DAILY
Qty: 90 TABLET | Refills: 1 | Status: SHIPPED | OUTPATIENT
Start: 2024-08-27

## 2024-08-30 ENCOUNTER — APPOINTMENT (OUTPATIENT)
Dept: ONCOLOGY | Facility: MEDICAL CENTER | Age: 89
End: 2024-08-30
Attending: STUDENT IN AN ORGANIZED HEALTH CARE EDUCATION/TRAINING PROGRAM
Payer: MEDICARE

## 2024-09-05 ENCOUNTER — HOSPITAL ENCOUNTER (OUTPATIENT)
Facility: MEDICAL CENTER | Age: 89
End: 2024-09-05
Attending: STUDENT IN AN ORGANIZED HEALTH CARE EDUCATION/TRAINING PROGRAM
Payer: MEDICARE

## 2024-09-05 LAB
ALBUMIN SERPL BCP-MCNC: 3.2 G/DL (ref 3.2–4.9)
ALBUMIN/GLOB SERPL: 0.8 G/DL
ALP SERPL-CCNC: 160 U/L (ref 30–99)
ALT SERPL-CCNC: 7 U/L (ref 2–50)
ANION GAP SERPL CALC-SCNC: 12 MMOL/L (ref 7–16)
ANISOCYTOSIS BLD QL SMEAR: ABNORMAL
AST SERPL-CCNC: 23 U/L (ref 12–45)
BASOPHILS # BLD AUTO: 0.9 % (ref 0–1.8)
BASOPHILS # BLD: 0.08 K/UL (ref 0–0.12)
BILIRUB SERPL-MCNC: 0.8 MG/DL (ref 0.1–1.5)
BUN SERPL-MCNC: 17 MG/DL (ref 8–22)
BURR CELLS BLD QL SMEAR: NORMAL
CALCIUM ALBUM COR SERPL-MCNC: 9.1 MG/DL (ref 8.5–10.5)
CALCIUM SERPL-MCNC: 8.5 MG/DL (ref 8.5–10.5)
CHLORIDE SERPL-SCNC: 99 MMOL/L (ref 96–112)
CO2 SERPL-SCNC: 24 MMOL/L (ref 20–33)
COMMENT 1642: NORMAL
CREAT SERPL-MCNC: 1.1 MG/DL (ref 0.5–1.4)
EOSINOPHIL # BLD AUTO: 0.25 K/UL (ref 0–0.51)
EOSINOPHIL NFR BLD: 2.8 % (ref 0–6.9)
ERYTHROCYTE [DISTWIDTH] IN BLOOD BY AUTOMATED COUNT: 57.5 FL (ref 35.9–50)
FERRITIN SERPL-MCNC: 63.1 NG/ML (ref 22–322)
GFR SERPLBLD CREATININE-BSD FMLA CKD-EPI: 64 ML/MIN/1.73 M 2
GLOBULIN SER CALC-MCNC: 3.8 G/DL (ref 1.9–3.5)
GLUCOSE SERPL-MCNC: 91 MG/DL (ref 65–99)
HCT VFR BLD AUTO: 41.1 % (ref 42–52)
HGB BLD-MCNC: 12.3 G/DL (ref 14–18)
IMM GRANULOCYTES # BLD AUTO: 0.04 K/UL (ref 0–0.11)
IMM GRANULOCYTES NFR BLD AUTO: 0.5 % (ref 0–0.9)
IRON SATN MFR SERPL: 13 % (ref 15–55)
IRON SERPL-MCNC: 39 UG/DL (ref 50–180)
LYMPHOCYTES # BLD AUTO: 0.97 K/UL (ref 1–4.8)
LYMPHOCYTES NFR BLD: 11 % (ref 22–41)
MCH RBC QN AUTO: 29.8 PG (ref 27–33)
MCHC RBC AUTO-ENTMCNC: 29.9 G/DL (ref 32.3–36.5)
MCV RBC AUTO: 99.5 FL (ref 81.4–97.8)
MICROCYTES BLD QL SMEAR: ABNORMAL
MONOCYTES # BLD AUTO: 0.71 K/UL (ref 0–0.85)
MONOCYTES NFR BLD AUTO: 8 % (ref 0–13.4)
MORPHOLOGY BLD-IMP: NORMAL
NEUTROPHILS # BLD AUTO: 6.79 K/UL (ref 1.82–7.42)
NEUTROPHILS NFR BLD: 76.8 % (ref 44–72)
NRBC # BLD AUTO: 0 K/UL
NRBC BLD-RTO: 0 /100 WBC (ref 0–0.2)
PLATELET # BLD AUTO: 264 K/UL (ref 164–446)
PLATELET BLD QL SMEAR: NORMAL
PMV BLD AUTO: 10.2 FL (ref 9–12.9)
POIKILOCYTOSIS BLD QL SMEAR: NORMAL
POTASSIUM SERPL-SCNC: 4.7 MMOL/L (ref 3.6–5.5)
PROT SERPL-MCNC: 7 G/DL (ref 6–8.2)
RBC # BLD AUTO: 4.13 M/UL (ref 4.7–6.1)
RBC BLD AUTO: PRESENT
SODIUM SERPL-SCNC: 135 MMOL/L (ref 135–145)
TIBC SERPL-MCNC: 298 UG/DL (ref 250–450)
TOXIC GRANULES BLD QL SMEAR: NORMAL
TRANSFERRIN SERPL-MCNC: 243 MG/DL (ref 200–370)
UIBC SERPL-MCNC: 259 UG/DL (ref 110–370)
WBC # BLD AUTO: 8.8 K/UL (ref 4.8–10.8)

## 2024-09-05 PROCEDURE — 85025 COMPLETE CBC W/AUTO DIFF WBC: CPT

## 2024-09-05 PROCEDURE — 80053 COMPREHEN METABOLIC PANEL: CPT

## 2024-09-05 PROCEDURE — 82728 ASSAY OF FERRITIN: CPT

## 2024-09-05 PROCEDURE — 84466 ASSAY OF TRANSFERRIN: CPT

## 2024-09-05 PROCEDURE — 83550 IRON BINDING TEST: CPT

## 2024-09-05 PROCEDURE — 83540 ASSAY OF IRON: CPT

## 2024-09-05 RX ORDER — 0.9 % SODIUM CHLORIDE 0.9 %
10 VIAL (ML) INJECTION PRN
Status: CANCELLED | OUTPATIENT
Start: 2024-09-06

## 2024-09-05 RX ORDER — METHYLPREDNISOLONE SODIUM SUCCINATE 125 MG/2ML
125 INJECTION, POWDER, LYOPHILIZED, FOR SOLUTION INTRAMUSCULAR; INTRAVENOUS PRN
Status: CANCELLED | OUTPATIENT
Start: 2024-09-06

## 2024-09-05 RX ORDER — DIPHENHYDRAMINE HYDROCHLORIDE 50 MG/ML
50 INJECTION INTRAMUSCULAR; INTRAVENOUS PRN
Status: CANCELLED | OUTPATIENT
Start: 2024-09-20

## 2024-09-05 RX ORDER — EPINEPHRINE 1 MG/ML(1)
0.5 AMPUL (ML) INJECTION PRN
Status: CANCELLED | OUTPATIENT
Start: 2024-09-20

## 2024-09-05 RX ORDER — 0.9 % SODIUM CHLORIDE 0.9 %
3 VIAL (ML) INJECTION PRN
Status: CANCELLED | OUTPATIENT
Start: 2024-09-05

## 2024-09-05 RX ORDER — PROCHLORPERAZINE MALEATE 10 MG
10 TABLET ORAL EVERY 6 HOURS PRN
Status: CANCELLED | OUTPATIENT
Start: 2024-09-06

## 2024-09-05 RX ORDER — EPINEPHRINE 1 MG/ML(1)
0.5 AMPUL (ML) INJECTION PRN
Status: CANCELLED | OUTPATIENT
Start: 2024-09-06

## 2024-09-05 RX ORDER — ONDANSETRON 2 MG/ML
4 INJECTION INTRAMUSCULAR; INTRAVENOUS PRN
Status: CANCELLED | OUTPATIENT
Start: 2024-09-06

## 2024-09-05 RX ORDER — 0.9 % SODIUM CHLORIDE 0.9 %
10 VIAL (ML) INJECTION PRN
Status: CANCELLED | OUTPATIENT
Start: 2024-09-20

## 2024-09-05 RX ORDER — 0.9 % SODIUM CHLORIDE 0.9 %
10 VIAL (ML) INJECTION PRN
Status: CANCELLED | OUTPATIENT
Start: 2024-09-05

## 2024-09-05 RX ORDER — ONDANSETRON 8 MG/1
8 TABLET, ORALLY DISINTEGRATING ORAL PRN
Status: CANCELLED | OUTPATIENT
Start: 2024-09-06

## 2024-09-05 RX ORDER — ONDANSETRON 8 MG/1
8 TABLET, ORALLY DISINTEGRATING ORAL PRN
Status: CANCELLED | OUTPATIENT
Start: 2024-09-20

## 2024-09-05 RX ORDER — SODIUM CHLORIDE 9 MG/ML
INJECTION, SOLUTION INTRAVENOUS CONTINUOUS
Status: CANCELLED | OUTPATIENT
Start: 2024-09-06

## 2024-09-05 RX ORDER — 0.9 % SODIUM CHLORIDE 0.9 %
3 VIAL (ML) INJECTION PRN
Status: CANCELLED | OUTPATIENT
Start: 2024-09-20

## 2024-09-05 RX ORDER — DIPHENHYDRAMINE HYDROCHLORIDE 50 MG/ML
50 INJECTION INTRAMUSCULAR; INTRAVENOUS PRN
Status: CANCELLED | OUTPATIENT
Start: 2024-09-06

## 2024-09-05 RX ORDER — SODIUM CHLORIDE 9 MG/ML
INJECTION, SOLUTION INTRAVENOUS CONTINUOUS
Status: CANCELLED | OUTPATIENT
Start: 2024-09-20

## 2024-09-05 RX ORDER — 0.9 % SODIUM CHLORIDE 0.9 %
VIAL (ML) INJECTION PRN
Status: CANCELLED | OUTPATIENT
Start: 2024-09-05

## 2024-09-05 RX ORDER — PROCHLORPERAZINE MALEATE 10 MG
10 TABLET ORAL EVERY 6 HOURS PRN
Status: CANCELLED | OUTPATIENT
Start: 2024-09-20

## 2024-09-05 RX ORDER — METHYLPREDNISOLONE SODIUM SUCCINATE 125 MG/2ML
125 INJECTION, POWDER, LYOPHILIZED, FOR SOLUTION INTRAMUSCULAR; INTRAVENOUS PRN
Status: CANCELLED | OUTPATIENT
Start: 2024-09-20

## 2024-09-05 RX ORDER — 0.9 % SODIUM CHLORIDE 0.9 %
VIAL (ML) INJECTION PRN
Status: CANCELLED | OUTPATIENT
Start: 2024-09-06

## 2024-09-05 RX ORDER — 0.9 % SODIUM CHLORIDE 0.9 %
VIAL (ML) INJECTION PRN
Status: CANCELLED | OUTPATIENT
Start: 2024-09-20

## 2024-09-05 RX ORDER — ONDANSETRON 2 MG/ML
4 INJECTION INTRAMUSCULAR; INTRAVENOUS PRN
Status: CANCELLED | OUTPATIENT
Start: 2024-09-20

## 2024-09-05 RX ORDER — 0.9 % SODIUM CHLORIDE 0.9 %
3 VIAL (ML) INJECTION PRN
Status: CANCELLED | OUTPATIENT
Start: 2024-09-06

## 2024-09-06 ENCOUNTER — OUTPATIENT INFUSION SERVICES (OUTPATIENT)
Dept: ONCOLOGY | Facility: MEDICAL CENTER | Age: 89
End: 2024-09-06
Attending: STUDENT IN AN ORGANIZED HEALTH CARE EDUCATION/TRAINING PROGRAM
Payer: MEDICARE

## 2024-09-06 VITALS
OXYGEN SATURATION: 97 % | RESPIRATION RATE: 18 BRPM | HEART RATE: 82 BPM | SYSTOLIC BLOOD PRESSURE: 114 MMHG | WEIGHT: 165.79 LBS | DIASTOLIC BLOOD PRESSURE: 67 MMHG | HEIGHT: 71 IN | BODY MASS INDEX: 23.21 KG/M2 | TEMPERATURE: 97.7 F

## 2024-09-06 DIAGNOSIS — C84.09 MYCOSIS FUNGOIDES INVOLVING EXTRANODAL SITE EXCLUDING SPLEEN AND OTHER SOLID ORGANS (HCC): ICD-10-CM

## 2024-09-06 PROCEDURE — 700105 HCHG RX REV CODE 258: Performed by: STUDENT IN AN ORGANIZED HEALTH CARE EDUCATION/TRAINING PROGRAM

## 2024-09-06 PROCEDURE — 700111 HCHG RX REV CODE 636 W/ 250 OVERRIDE (IP): Mod: JZ,JG | Performed by: STUDENT IN AN ORGANIZED HEALTH CARE EDUCATION/TRAINING PROGRAM

## 2024-09-06 PROCEDURE — 96413 CHEMO IV INFUSION 1 HR: CPT

## 2024-09-06 RX ADMIN — MOGAMULIZUMAB-KPKC 80 MG: 4 INJECTION INTRAVENOUS at 13:58

## 2024-09-06 ASSESSMENT — FIBROSIS 4 INDEX: FIB4 SCORE: 2.9

## 2024-09-06 NOTE — PROGRESS NOTES
Chemotherapy Verification - PRIMARY RN      Height = 180cm  Weight = 75.2kg  BSA = 1.94       Medication: Poteligeo  Dose: 1mg/kg  Calculated Dose: 75.2mg                             (In mg/m2, AUC, mg/kg)           I confirm this process was performed independently with the BSA and all final chemotherapy dosing calculations congruent.  Any discrepancies of 10% or greater have been addressed with the chemotherapy pharmacist. The resolution of the discrepancy has been documented in the EPIC progress notes.

## 2024-09-06 NOTE — PROGRESS NOTES
"Pharmacy Chemotherapy Calculation:    Dx: Mycosis Fungoides/Sezary Syndrome        Protocol: Mogamulizumab-kpkc     *Dosing Reference*  Mogamulizumab-kpkc 1 mg/kg IV over 60 minutes weekly on Days 1, 8, 15, and 22   28-day cycle for 1 cycle  ~Followed by~  Mogamulizumab-kpkc 1 mg/kg IV over 60 minutes on Days 1 and 15  28-day cycle until disease progression or unacceptable toxicity     NCCN Guidelines® for Primary Cutaneous Lymphomas V.1.2024.   Tracey VARGAS et al. Lancet Oncol. 2018;19(9):5131-8881.a    Allergies: Gold and Silver       /67   Pulse 82   Temp 36.5 °C (97.7 °F) (Temporal)   Resp 18   Ht 1.8 m (5' 10.87\")   Wt 75.2 kg (165 lb 12.6 oz)   SpO2 97%   BMI 23.21 kg/m²  Body surface area is 1.94 meters squared.    Labs 9/5/24:  ANC~ 6790 Plt = 264k   Hgb = 12.3     SCr = 1.1 mg/dL CrCl ~ 48 mL/min   AST/ALT/AP = 23/7/160 TBili = 0.8  K+ = 4.7      06/20/24 10:59   Hep B Surface Antibody Quant 207.00 (H)   Hepatitis B Surface Antigen Non-Reactive   Hepatitis B Core Ab, Total Reactive !   Hepatitis C Antibody Non-Reactive   Results indicative of immunity due to natural infection     Drug Order   (Drug name, dose, route, IV Fluid & volume, frequency, number of doses) Cycle 3 Day 1  Previous treatment: C2D15 8/23/24     Medication = Mogamulizumab-kpkc  Base Dose = 1 mg/kg  Calc Dose: Base Dose x 75.2 kg = 75.2 mg  Final Dose = 80 mg  Route = IV  Fluid & Volume =  mL  Admin Duration = Over 60 minutes          Rounded to vial size (within 10%) per dose rounding protocol, OK to treat with final dose     By my signature below, I confirm this process was performed independently with the BSA and all final chemotherapy dosing calculations congruent. I have reviewed the above chemotherapy order and that my calculation of the final dose and BSA (when applicable) corroborate those calculations of the  pharmacist. Discrepancies of 10% or greater in the written dose have been addressed and " documented within the EPIC Progress notes.    Maren Dorantes, ElodiaD

## 2024-09-06 NOTE — PROGRESS NOTES
"Pharmacy Chemotherapy Calculation:    Dx: Mycosis Fungoides/Sezary Syndrome      Cycle 3, Day 1  Previous treatment = C2D15 8/23/24    Protocol: Mogamulizumab-kpkc     *Dosing Reference*  Mogamulizumab-kpkc 1 mg/kg IV over 60 minutes weekly on Days 1, 8, 15, and 22   28-day cycle for 1 cycle  ~Followed by~  Mogamulizumab-kpkc 1 mg/kg IV over 60 minutes on Days 1 and 15  28-day cycle until disease progression or unacceptable toxicity   NCCN Guidelines® for Primary Cutaneous Lymphomas V.1.2024.   Tracey VARGAS et al. Lancet Oncol. 2018;19(9):6329-6000.a    Allergies: Gold and Silver  /67   Pulse 82   Temp 36.5 °C (97.7 °F) (Temporal)   Resp 18   Ht 1.8 m (5' 10.87\")   Wt 75.2 kg (165 lb 12.6 oz)   SpO2 97%   BMI 23.21 kg/m²   Body surface area is 1.94 meters squared.     06/20/24 10:59   Hep B Surface Antibody Quant 207.00 (H)   Hepatitis B Surface Antigen Non-Reactive   Hepatitis B Core Ab, Total Reactive !   Results indicative of immunity due to natural infection    Labs 9/5/24  ANC~ 6790 Plt = 264k   Hgb = 12.3       Mogamulizumab 1 mg/kg x 75.2 kg = 75.2 mg   <10% difference, okay to treat with final dose = 80 mg IV    Jamie Naranjo, PharmD    "

## 2024-09-06 NOTE — PROGRESS NOTES
Chemotherapy Verification - SECONDARY RN       Height = 1.8m  Weight = 75.2 kg  BSA = 1.94 m^2       Medication: mogamulizumab-[facility] (Poteligeo)  Dose: 1 mg/kg  Calculated Dose: 75.2 mg                             (In mg/m2, AUC, mg/kg)         I confirm that this process was performed independently.

## 2024-09-06 NOTE — PROGRESS NOTES
Pt arrived ambulatory accompanied by friend, c/o ongoing wound care issues but no other new problems.  Plan of care reviewed, verbalized understanding and wishes to proceed.  Labs drawn 9/5 reviewed, WNL and okay for treatment.  PIV started in RFA with good blood return, Poteligeo infused over 60 minutes via 0.2 micron filter, tolerated well, no reaction noted.  Pt Dc'd home without incident and will f/u as scheduled.

## 2024-09-17 PROBLEM — I34.0 MODERATE MITRAL REGURGITATION: Status: RESOLVED | Noted: 2024-04-30 | Resolved: 2024-09-17

## 2024-09-17 PROBLEM — R01.1 CARDIAC MURMUR: Status: RESOLVED | Noted: 2021-04-06 | Resolved: 2024-09-17

## 2024-09-20 ENCOUNTER — OUTPATIENT INFUSION SERVICES (OUTPATIENT)
Dept: ONCOLOGY | Facility: MEDICAL CENTER | Age: 89
End: 2024-09-20
Attending: STUDENT IN AN ORGANIZED HEALTH CARE EDUCATION/TRAINING PROGRAM
Payer: MEDICARE

## 2024-09-20 VITALS
WEIGHT: 166.45 LBS | OXYGEN SATURATION: 98 % | DIASTOLIC BLOOD PRESSURE: 89 MMHG | TEMPERATURE: 97.4 F | SYSTOLIC BLOOD PRESSURE: 140 MMHG | BODY MASS INDEX: 23.3 KG/M2 | RESPIRATION RATE: 18 BRPM | HEIGHT: 71 IN | HEART RATE: 92 BPM

## 2024-09-20 DIAGNOSIS — C84.09 MYCOSIS FUNGOIDES INVOLVING EXTRANODAL SITE EXCLUDING SPLEEN AND OTHER SOLID ORGANS (HCC): ICD-10-CM

## 2024-09-20 PROCEDURE — 96413 CHEMO IV INFUSION 1 HR: CPT

## 2024-09-20 PROCEDURE — 700105 HCHG RX REV CODE 258: Performed by: STUDENT IN AN ORGANIZED HEALTH CARE EDUCATION/TRAINING PROGRAM

## 2024-09-20 PROCEDURE — 700111 HCHG RX REV CODE 636 W/ 250 OVERRIDE (IP): Mod: JZ,JG | Performed by: STUDENT IN AN ORGANIZED HEALTH CARE EDUCATION/TRAINING PROGRAM

## 2024-09-20 RX ADMIN — MOGAMULIZUMAB-KPKC 80 MG: 4 INJECTION INTRAVENOUS at 15:51

## 2024-09-20 ASSESSMENT — FIBROSIS 4 INDEX: FIB4 SCORE: 2.93

## 2024-09-20 NOTE — PROGRESS NOTES
"Pharmacy Chemotherapy Calculation:    Dx: Mycosis Fungoides/Sezary Syndrome        Protocol: Mogamulizumab-kpkc     *Dosing Reference*  Mogamulizumab-kpkc 1 mg/kg IV over 60 minutes weekly on Days 1, 8, 15, and 22   28-day cycle for 1 cycle  ~Followed by~  Mogamulizumab-kpkc 1 mg/kg IV over 60 minutes on Days 1 and 15  28-day cycle until disease progression or unacceptable toxicity     NCCN Guidelines® for Primary Cutaneous Lymphomas V.1.2024.   Tracey VARGAS et al. Lancet Oncol. 2018;19(9):5122-8847.a    Allergies: Gold and Silver       BP (!) 140/89   Pulse 92   Temp 36.3 °C (97.4 °F) (Temporal)   Resp 18   Ht 1.8 m (5' 10.87\")   Wt 75.5 kg (166 lb 7.2 oz)   SpO2 98%   BMI 23.30 kg/m²  Body surface area is 1.94 meters squared.    Labs 9/5/24:  ANC~ 6790 Plt = 264k   Hgb = 12.3     SCr = 1.1 mg/dL CrCl ~ 48 mL/min   AST/ALT/AP = 23/7/160 TBili = 0.8  K+ = 4.7      06/20/24 10:59   Hep B Surface Antibody Quant 207.00 (H)   Hepatitis B Surface Antigen Non-Reactive   Hepatitis B Core Ab, Total Reactive !   Hepatitis C Antibody Non-Reactive   Results indicative of immunity due to natural infection     Drug Order   (Drug name, dose, route, IV Fluid & volume, frequency, number of doses) Cycle 3 Day 15  Previous treatment: C3D1 on 9/6/24     Medication = Mogamulizumab-kpkc  Base Dose = 1 mg/kg  Calc Dose: Base Dose x 75.5 kg = 75.5 mg  Final Dose = 80 mg  Route = IV  Fluid & Volume =  mL  Admin Duration = Over 60 minutes          Rounded to vial size (within 10%) per dose rounding protocol, OK to treat with final dose     By my signature below, I confirm this process was performed independently with the BSA and all final chemotherapy dosing calculations congruent. I have reviewed the above chemotherapy order and that my calculation of the final dose and BSA (when applicable) corroborate those calculations of the  pharmacist. Discrepancies of 10% or greater in the written dose have been addressed and " documented within the EPIC Progress notes.    Caty Ley, PharmD, BCOP

## 2024-09-20 NOTE — PROGRESS NOTES
Chemotherapy Verification - PRIMARY RN      Height = 1.8m  Weight = 75.5kg  BSA = 1.94m^2       Medication: Mogamulizumab-[facility]  Dose: 1mg/kg  Calculated Dose: 75.5mg                             (In mg/m2, AUC, mg/kg)       I confirm this process was performed independently.

## 2024-09-20 NOTE — PROGRESS NOTES
"Pharmacy Chemotherapy Calculation:    Dx: Mycosis Fungoides/Sezary Syndrome      Cycle 3, Day 1  Previous treatment = C2D15 8/23/24    Protocol: Mogamulizumab-kpkc     *Dosing Reference*  Mogamulizumab-kpkc 1 mg/kg IV over 60 minutes weekly on Days 1, 8, 15, and 22   28-day cycle for 1 cycle  ~Followed by~  Mogamulizumab-kpkc 1 mg/kg IV over 60 minutes on Days 1 and 15  28-day cycle until disease progression or unacceptable toxicity   NCCN Guidelines® for Primary Cutaneous Lymphomas V.1.2024.   Tracey VARGAS et al. Lancet Oncol. 2018;19(9):8824-5643.a    Allergies: Gold and Silver  BP (!) 140/89   Pulse 92   Temp 36.3 °C (97.4 °F) (Temporal)   Resp 18   Ht 1.8 m (5' 10.87\")   Wt 75.5 kg (166 lb 7.2 oz)   SpO2 98%   BMI 23.30 kg/m²   Body surface area is 1.94 meters squared.     06/20/24 10:59   Hep B Surface Antibody Quant 207.00 (H)   Hepatitis B Surface Antigen Non-Reactive   Hepatitis B Core Ab, Total Reactive !   Results indicative of immunity due to natural infection    Labs 9/5/24  ANC~ 6790 Plt = 264k   Hgb = 12.3       No lab parameters for D15    Mogamulizumab 1 mg/kg x 75.5 kg = 75.5 mg   <10% difference, okay to treat with final dose = 80 mg IV    Maren Dorantes, PharmD    "

## 2024-09-20 NOTE — PROGRESS NOTES
Mac came into Infusions Services for Day 15/ Cycle 3 of poteligeo. Pt denied having any new or acute complaints today, reports tolerating past treatments well. PIV started, had + blood return flushed briskly. Pt given poteligeo as prescribed, tolerated well, denied having any complaints during or after infusion. PIV discontinued, bleeding controlled with gauze and coban. PT has future appointments. Discharged to self care.

## 2024-09-20 NOTE — PROGRESS NOTES
Chemotherapy Verification - SECONDARY RN       Height = 180 cm  Weight = 75.5 kg  BSA = 1.94 m2       Medication: Poteligeo  Dose: 1 mg/kg  Calculated Dose: 75.5 mg                             (In mg/m2, AUC, mg/kg)       I confirm that this process was performed independently.

## 2024-09-23 ENCOUNTER — TELEPHONE (OUTPATIENT)
Dept: CARDIOLOGY | Facility: MEDICAL CENTER | Age: 89
End: 2024-09-23
Payer: MEDICARE

## 2024-09-23 PROBLEM — Z86.16 HISTORY OF COVID-19: Status: RESOLVED | Noted: 2024-01-31 | Resolved: 2024-09-23

## 2024-09-23 NOTE — TELEPHONE ENCOUNTER
LVM for pt to call back and jazmine watchman consult w/ AK, DS, or SS per SC. SC would also like pt scheduled for a f/v at [place]  with AB - first avail rn are in Jan and this is okay w/ SC. /cg 09/23/24

## 2024-09-27 NOTE — TELEPHONE ENCOUNTER
Phone Number Called: [phone]      Call outcome: Spoke to patient regarding message below.    Message: Called to see what questions pt had about watchman procedure. Advised scheduling lvm with pt to get him scheduled for watchman consultation. Advised will reach out to scheduling to get him scheduled.     To scheduling, please call and sherri pt for watchman consultation.

## 2024-09-27 NOTE — TELEPHONE ENCOUNTER
SC    Caller: Mac Yu    Topic/issue:   Mac would like a call back to discuss the Watchman procedure needed.     Callback Number: [phone]    Thank you,   Soni SAGASTUME

## 2024-10-04 ENCOUNTER — APPOINTMENT (OUTPATIENT)
Dept: ONCOLOGY | Facility: MEDICAL CENTER | Age: 89
End: 2024-10-04
Attending: STUDENT IN AN ORGANIZED HEALTH CARE EDUCATION/TRAINING PROGRAM
Payer: MEDICARE

## 2024-10-16 ENCOUNTER — APPOINTMENT (OUTPATIENT)
Dept: CARDIOLOGY | Facility: MEDICAL CENTER | Age: 89
End: 2024-10-16
Attending: INTERNAL MEDICINE
Payer: MEDICARE